# Patient Record
Sex: FEMALE | Race: WHITE | Employment: UNEMPLOYED | URBAN - METROPOLITAN AREA
[De-identification: names, ages, dates, MRNs, and addresses within clinical notes are randomized per-mention and may not be internally consistent; named-entity substitution may affect disease eponyms.]

---

## 2017-02-15 ENCOUNTER — OFFICE VISIT (OUTPATIENT)
Dept: PAIN MANAGEMENT | Age: 47
End: 2017-02-15

## 2017-02-15 VITALS — DIASTOLIC BLOOD PRESSURE: 71 MMHG | HEART RATE: 74 BPM | SYSTOLIC BLOOD PRESSURE: 111 MMHG

## 2017-02-15 DIAGNOSIS — M46.1 SACROILIITIS (HCC): Primary | ICD-10-CM

## 2017-02-15 DIAGNOSIS — M76.899 ENTHESOPATHY OF HIP REGION, UNSPECIFIED LATERALITY: ICD-10-CM

## 2017-02-15 RX ORDER — CLONAZEPAM 2 MG/1
TABLET ORAL
COMMUNITY

## 2017-02-15 RX ORDER — OXYBUTYNIN CHLORIDE 5 MG/1
5 TABLET ORAL AS NEEDED
COMMUNITY

## 2017-02-15 RX ORDER — GABAPENTIN 300 MG/1
300 CAPSULE ORAL 3 TIMES DAILY
Status: ON HOLD | COMMUNITY
End: 2017-03-14

## 2017-02-15 RX ORDER — OXYCODONE AND ACETAMINOPHEN 5; 325 MG/1; MG/1
1 TABLET ORAL
Status: ON HOLD | COMMUNITY
End: 2017-03-14

## 2017-02-15 RX ORDER — CLONIDINE HYDROCHLORIDE 0.1 MG/1
TABLET ORAL 2 TIMES DAILY
COMMUNITY
End: 2018-02-07

## 2017-02-15 RX ORDER — LAMOTRIGINE 200 MG/1
TABLET ORAL
COMMUNITY

## 2017-02-15 RX ORDER — ESTRADIOL 0.5 MG/1
TABLET ORAL DAILY
COMMUNITY

## 2017-02-15 RX ORDER — ROPINIROLE 0.5 MG/1
TABLET, FILM COATED ORAL 3 TIMES DAILY
COMMUNITY
End: 2018-02-07

## 2017-02-15 RX ORDER — LISINOPRIL 20 MG/1
TABLET ORAL DAILY
COMMUNITY

## 2017-02-15 RX ORDER — ASPIRIN 325 MG
325 TABLET ORAL DAILY
COMMUNITY
End: 2018-02-07

## 2017-02-15 RX ORDER — PAROXETINE 30 MG/1
30 TABLET, FILM COATED ORAL DAILY
COMMUNITY
End: 2018-02-07

## 2017-02-16 ENCOUNTER — OFFICE VISIT (OUTPATIENT)
Dept: PAIN MANAGEMENT | Age: 47
End: 2017-02-16

## 2017-02-16 VITALS
DIASTOLIC BLOOD PRESSURE: 74 MMHG | BODY MASS INDEX: 35.03 KG/M2 | SYSTOLIC BLOOD PRESSURE: 121 MMHG | HEIGHT: 66 IN | WEIGHT: 218 LBS | HEART RATE: 88 BPM

## 2017-02-16 DIAGNOSIS — M96.1 LUMBAR POST-LAMINECTOMY SYNDROME: Primary | ICD-10-CM

## 2017-02-16 DIAGNOSIS — M46.1 SACROILIITIS (HCC): ICD-10-CM

## 2017-02-16 DIAGNOSIS — G89.4 CHRONIC PAIN SYNDROME: ICD-10-CM

## 2017-02-16 DIAGNOSIS — M76.892 ENTHESOPATHY OF HIP REGION ON BOTH SIDES: ICD-10-CM

## 2017-02-16 DIAGNOSIS — M54.17 LUMBOSACRAL RADICULOPATHY: ICD-10-CM

## 2017-02-16 DIAGNOSIS — Z79.899 ENCOUNTER FOR LONG-TERM (CURRENT) USE OF MEDICATIONS: ICD-10-CM

## 2017-02-16 DIAGNOSIS — M76.891 ENTHESOPATHY OF HIP REGION ON BOTH SIDES: ICD-10-CM

## 2017-02-16 LAB
ALCOHOL UR POC: NORMAL
AMPHETAMINES UR POC: NEGATIVE
BARBITURATES UR POC: NEGATIVE
BENZODIAZEPINES UR POC: NORMAL
BUPRENORPHINE UR POC: NORMAL
CANNABINOIDS UR POC: NEGATIVE
CARISOPRODOL UR POC: NORMAL
COCAINE UR POC: NEGATIVE
FENTANYL UR POC: NORMAL
MDMA/ECSTASY UR POC: NEGATIVE
METHADONE UR POC: NEGATIVE
METHAMPHETAMINE UR POC: NEGATIVE
METHYLPHENIDATE UR POC: NEGATIVE
OPIATES UR POC: NORMAL
OXYCODONE UR POC: NORMAL
PHENCYCLIDINE UR POC: NEGATIVE
PROPOXYPHENE UR POC: NORMAL
TRAMADOL UR POC: NORMAL
TRICYCLICS UR POC: NEGATIVE

## 2017-02-16 RX ORDER — MORPHINE SULFATE 15 MG/1
15 TABLET ORAL
Qty: 60 TAB | Refills: 0 | Status: ON HOLD | OUTPATIENT
Start: 2017-02-16 | End: 2017-02-21

## 2017-02-16 RX ORDER — MORPHINE SULFATE 15 MG/1
15 TABLET ORAL
Qty: 60 TAB | Refills: 0 | Status: ON HOLD | OUTPATIENT
Start: 2017-03-17 | End: 2017-02-22 | Stop reason: SINTOL

## 2017-02-16 RX ORDER — ALPRAZOLAM 1 MG/1
TABLET ORAL
COMMUNITY
End: 2018-02-07

## 2017-02-16 RX ORDER — MORPHINE SULFATE 15 MG/1
15 TABLET, FILM COATED, EXTENDED RELEASE ORAL 3 TIMES DAILY
Qty: 90 TAB | Refills: 0 | Status: SHIPPED | OUTPATIENT
Start: 2017-03-17 | End: 2017-02-22 | Stop reason: SINTOL

## 2017-02-16 RX ORDER — MORPHINE SULFATE 15 MG/1
TABLET, FILM COATED, EXTENDED RELEASE ORAL
Qty: 80 TAB | Refills: 0 | Status: ON HOLD | OUTPATIENT
Start: 2017-02-16 | End: 2017-02-21

## 2017-02-16 RX ORDER — GABAPENTIN 400 MG/1
400 CAPSULE ORAL 3 TIMES DAILY
Qty: 90 CAP | Refills: 5 | Status: SHIPPED | OUTPATIENT
Start: 2017-02-16 | End: 2017-06-21 | Stop reason: SDUPTHER

## 2017-02-16 NOTE — MR AVS SNAPSHOT
Visit Information Date & Time Provider Department Dept. Phone Encounter #  
 2/16/2017  1:00 PM Renetta Ventura MD [facility] [phone] Follow-up Instructions Return in about 2 months (around 4/16/2017). Your Appointments 2/21/2017  8:10 AM  
PROCEDURE with Alyssa Dumont MD  
[facility] NEURO (SARBJIT SCHEDULING) Appt Note: B/L SIJ and B/L TBI per Dorie ( 1 out 2). ..... [facility] [address] [facility] [address]  
[phone]  
  
   
 [facility] [address]  
  
    
 3/22/2017  4:30 PM  
Office Visit with Alyssa Dumont MD  
[facility] [address] [facility]) Appt Note: Office visit post procedure. .. Billy Edgar [facility] [address] [address]  
[phone] [facility] [phone] Upcoming Health Maintenance Date Due DTaP/Tdap/Td series (1 - Tdap) 1/30/1993 PAP AKA CERVICAL CYTOLOGY 1/30/1993 INFLUENZA AGE 9 TO ADULT 8/1/2016 Allergies as of 2/16/2017  Review Complete On: 2/16/2017 By: Renetta Ventura MD  
  
 Severity Noted Reaction Type Reactions Iodine High 02/15/2017    Anaphylaxis Codeine  02/15/2017    Hives Haldol [Haloperidol Lactate]  02/15/2017    Other (comments)  
 violent Imitrex [Sumatriptan]  02/15/2017    Swelling Penicillins  02/15/2017    Hives Ultram [Tramadol]  02/15/2017    Hives Current Immunizations  Never Reviewed No immunizations on file. Not reviewed this visit You Were Diagnosed With   
  
 Codes Comments Encounter for long-term (current) use of medications    -  Primary ICD-10-CM: U87.146 ICD-9-CM: V58.69 Sacroiliitis ([facility] [address]     ICD-10-CM: M46.1 ICD-9-CM: 720.2 Enthesopathy of hip region on both sides     ICD-10-CM: M76.891, F64.266 ICD-9-CM: 726.5 Lumbar post-laminectomy syndrome     ICD-10-CM: M96.1 ICD-9-CM: 722.83 Lumbosacral radiculopathy     ICD-10-CM: M54.17 ICD-9-CM: 724.4 Chronic pain syndrome     ICD-10-CM: G89.4 ICD-9-CM: 338. 4 Vitals BP Pulse Height(growth percentile) Weight(growth percentile) BMI OB Status 121/74 88 5' 6\" (1.676 m) 218 lb (98.9 kg) 35.19 kg/m2 Hysterectomy Smoking Status Former Smoker Vitals History BMI and BSA Data Body Mass Index Body Surface Area  
 35.19 kg/m 2 2.15 m 2 Preferred Pharmacy Pharmacy Name Phone [facility], [address] [place] [address] [phone] Your Updated Medication List  
  
   
This list is accurate as of: 2/16/17  1:23 PM.  Always use your most recent med list.  
  
  
  
  
 aspirin 325 mg tablet Generic drug:  aspirin Take 325 mg by mouth daily. clonazePAM 2 mg tablet Commonly known as:  [place] Scrape Take  by mouth two (2) times a day. cloNIDine HCl 0.1 mg tablet Commonly known as:  CATAPRES Take  by mouth two (2) times a day. estradiol 0.5 mg tablet Commonly known as:  ESTRACE Take  by mouth daily. * gabapentin 300 mg capsule Commonly known as:  NEURONTIN Take 300 mg by mouth three (3) times daily. * gabapentin 400 mg capsule Commonly known as:  NEURONTIN Take 1 Cap by mouth three (3) times daily for 30 days. Indications: NEUROPATHIC PAIN  
  
 lamoTRIgine 200 mg tablet Commonly known as: LaMICtal  
Take  by mouth daily. lisinopril 20 mg tablet Commonly known as:  Doris Shawna Take  by mouth daily. * morphine CR 15 mg CR tablet Commonly known as:  MS CONTIN For chronic pain 1 po bid x 14 days, then 1 po tid  Indications: Chronic Pain, Severe Pain * morphine IR 15 mg tablet Commonly known as:  MS IR Take 1 Tab by mouth two (2) times daily as needed for Pain for up to 30 days. Max Daily Amount: 30 mg. Indications: Pain, Severe Pain * morphine CR 15 mg CR tablet Commonly known as:  MS CONTIN Take 1 Tab by mouth three (3) times daily for 30 days. Max Daily Amount: 45 mg. For chronic pain  Indications: Chronic Pain, Severe Pain Start taking on:  3/17/2017 * morphine IR 15 mg tablet Commonly known as:  MS IR Take 1 Tab by mouth two (2) times daily as needed for Pain for up to 30 days. Max Daily Amount: 30 mg. Indications: Pain Start taking on:  3/17/2017  
  
 oxybutynin 5 mg tablet Commonly known as:  XUJXRSYI Take 5 mg by mouth three (3) times daily. oxyCODONE-acetaminophen 5-325 mg per tablet Commonly known as:  PERCOCET Take 1 Tab by mouth every four (4) hours as needed for Pain. PARoxetine 30 mg tablet Commonly known as:  PAXIL Take 30 mg by mouth daily. rOPINIRole 0.5 mg tablet Commonly known as:  Veatrice Tamara Take  by mouth three (3) times daily. TROKENDI  mg capsule Generic drug:  topiramate ER Take  by mouth daily. XANAX 1 mg tablet Generic drug:  ALPRAZolam  
Take  by mouth. * Notice: This list has 6 medication(s) that are the same as other medications prescribed for you. Read the directions carefully, and ask your doctor or other care provider to review them with you. Prescriptions Printed Refills  
 morphine CR (MS CONTIN) 15 mg CR tablet 0 Starting on: 3/17/2017 Sig: Take 1 Tab by mouth three (3) times daily for 30 days. Max Daily Amount: 45 mg. For chronic pain  Indications: Chronic Pain, Severe Pain Class: Print Route: Oral  
 morphine CR (MS CONTIN) 15 mg CR tablet 0 Sig: For chronic pain 1 po bid x 14 days, then 1 po tid  Indications: Chronic Pain, Severe Pain Class: Print  
 morphine IR (MS IR) 15 mg tablet 0 Starting on: 3/17/2017 Sig: Take 1 Tab by mouth two (2) times daily as needed for Pain for up to 30 days. Max Daily Amount: 30 mg. Indications: Pain Class: Print  Route: Oral  
 morphine IR (MS IR) 15 mg tablet 0 Sig: Take 1 Tab by mouth two (2) times daily as needed for Pain for up to 30 days. Max Daily Amount: 30 mg. Indications: Pain, Severe Pain Class: Print Route: Oral  
  
Prescriptions Sent to Pharmacy Refills  
 gabapentin (NEURONTIN) 400 mg capsule 5 Sig: Take 1 Cap by mouth three (3) times daily for 30 days. Indications: NEUROPATHIC PAIN Class: Normal  
 Pharmacy: [address], [address] [facility] #: [phone] Route: Oral  
  
We Performed the Following AMB POC DRUG SCREEN () [ [facility]] DRUG SCREEN [[id number] Custom] Follow-up Instructions Return in about 2 months (around 4/16/2017). Patient Instructions Current health maintenance issues were reviewed and the patient was advised to followup with his/her PCP for completion of these items. Introducing [facility]! Magalys Haas introduces [employer] patient portal. Now you can access parts of your medical record, email your doctor's office, and request medication refills online. 1. In your internet browser, go to [email or web address] 2. Click on the First Time User? Click Here link in the Sign In box. You will see the New Member Sign Up page. 3. Enter your [employer] Access Code exactly as it appears below. You will not need to use this code after youve completed the sign-up process. If you do not sign up before the expiration date, you must request a new code. · [employer] Access Code: [email or web address] Expires: 5/4/2017  9:13 AM 
 
4. Enter the last four digits of your Social Security Number (xxxx) and Date of Birth (mm/dd/yyyy) as indicated and click Submit. You will be taken to the next sign-up page. 5. Create a [employer] ID. This will be your [employer] login ID and cannot be changed, so think of one that is secure and easy to remember. 6. Create a [employer] password. You can change your password at any time. 7. Enter your Password Reset Question and Answer. This can be used at a later time if you forget your password. 8. Enter your e-mail address. You will receive e-mail notification when new information is available in [address] 9. Click Sign Up. You can now view and download portions of your medical record. 10. Click the Download Summary menu link to download a portable copy of your medical information. If you have questions, please visit the Frequently Asked Questions section of the [employer] website. Remember, [employer] is NOT to be used for urgent needs. For medical emergencies, dial 911. Now available from your iPhone and Android! Please provide this summary of care documentation to your next provider. Your primary care clinician is listed as TAYE GASCA. If you have any questions after today's visit, please call [phone].
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
## 2017-02-16 NOTE — PROGRESS NOTES
[facility]  Interventional Pain Management Consultation History & Physical    PATIENT NAME:  Heber Corbin     YOB: 1970    DATE OF SERVICE:   2/15/2017      CHIEF COMPLAINT:  Back Pain; Hip Pain; and Leg Pain      HISTORY OF PRESENT ILLNESS:   Heber Corbin presents to the pain clinic today for initial evaluation and to consider interventional pain management options as indicated for the type and location of the pain the patient is presenting with. Heber Corbin patient presents for initial evaluation and consideration for interventional procedures as indicated. She is referred to us by Dr. Gil Zheng for evaluation and consideration for possible sacroiliac and possibly trochanteric bursa or hip injections. At today's evaluation patient endorses chronic low back pain, bilateral buttocks pain and bilateral hip pain of long-standing duration. She currently endorses most of her pain and bilateral buttocks area as well as bilateral lateral hip area. She endorses that both sides of her low back and hips feel like balls fire. She endorses aching and throbbing of both of her bilateral buttocks as well as lateral hip areas. Her pain medications are less effective for her than they previously had been. She is tried also physical therapy as well as home exercises with no appreciable benefit of her pain is noted. Pain is increased with prolonged sitting standing and walking. Pain is increased with lying on either side of her hips. By review of available medical records, patient is noted to have been followed at other interventional pain Center's and clinics for long-standing period of time. She is noted to have laminectomy and discectomy with redo at same level by Dr. Hernandez Po September 2001. Initial laminectomy and discectomy on the right side L5 was done June 2000 at [address] [facility], Dr. Taryn Mehta.   Dr. Smart Score Debbie Metz did a redo operation same levels. Patient went on to have a multilevel fusion L3-4 to S1 August 2008. She [place] [facility] [place] a period of time in [place]. Patient is noted to have a coronary artery stent single stent as well as lumbar laminectomy ×2 lumbar fusion ×2 as already stated. Smoked half pack a day. Patient had a recent lumbar MRI at [facility]. MRI was performed February 1, 2017. This indicates stable postoperative changes with bilateral laminectomies at L4-5 and L5-S1 posterior metal fixation L4 through S1. Scar tissue within the right L5 and perhaps right S1 lateral recesses. No evidence of recurrent or residual disc herniations. Mild annular bulging and 2 mm retrolisthesis with mild left neuroforaminal narrowing L3-4. L2-3 mild annular bulging and 2 mm retrolisthesis, again L2-3. We discussed options. Patient has history of chronic and severe low back pain. She has had 2 previous lumbar spine surgeries including lumbar spine decompression surgeries ×2 and as well lumbar spinal fusion L4-S1. She is previously been managed [place] [facility] where she has received her pain management medications and I believe interventional pain procedures as indicated and needed. Patient presents today with bilateral posterior buttocks pain and bilateral lateral hip pain and tenderness to palpation. I am in agreement with her primary care provider Dr. Rebeca Luis in that I believe the patient has bilateral sacroiliitis versus sacroiliac joint dysfunction, along with bilateral trochanteric bursitis. I will provide the patient with a series of bilateral sacroiliac joint injections and as well bilateral trochanteric bursa injections. We will probably do a series of at least 2 of these with p.o. Valium sedation.   I discussed the risk and benefits, indications contraindications side effects this procedure with the patient. She understands and wishes to proceed. She has no further questions. I have also discussed this briefly with the patient possibility of spinal cord stimulator trial placement. This may ultimately be her best option if other measures fail to help with her pain of her low back and lower extremities. For now, we will see how these above injections helped her pain. MRI: Reviewed imaging using skeleton spine model    PROCEDURES: Discussed bilateral sacroiliac as well as trochanteric bursa injections    MRI Results (most recent):  No results found for this or any previous visit. PAST MEDICAL HISTORY:   The patient  has no past medical history on file. PAST SURGICAL HISTORY:   The patient  has no past surgical history on file. CURRENT MEDICATIONS:   The patient has a current medication list which includes the following prescription(s): oxycodone-acetaminophen, oxybutynin, ropinirole, estradiol, topiramate er, clonazepam, gabapentin, lamotrigine, clonidine hcl, paroxetine, lisinopril, and aspirin. ALLERGIES:     Allergies   Allergen Reactions    Iodine Anaphylaxis    Codeine Hives    Haldol [Haloperidol Lactate] Other (comments)     violent    Imitrex [Sumatriptan] Swelling    Penicillins Hives    Ultram [Tramadol] Hives       FAMILY HISTORY:   The patient family history is not on file. SOCIAL HISTORY:   The patient  reports that she quit smoking about 2 years ago. Her smoking use included Cigarettes. She does not have any smokeless tobacco history on file. The patient  has no alcohol history on file. She also  has no drug history on file.     REVIEW OF SYSTEMS:   The patient denies fever, chills, weight loss (Constitutional), rash, itching (Skin), tinnitus, congestion (HENT), blurred vision, photophobia (Eyes), palpitations, orthopnea (Cardiovascular), hemoptysis, wheezing (Respiratory), nausea, vomiting, diarrhea (Gastrointestinal), dysuria, hematuria, urgency (Genitourinary), easy bruising, bleeding abnormalities (Hematologic), bowel or bladder incontinence, loss of consciousness (Neurologic), suicidal or homicidal ideation or hallucinations (Psychiatric). PHYSICAL EXAM:  VS:   Visit Vitals    /71 (BP 1 Location: Right arm, BP Patient Position: Sitting)    Pulse 74     General: Well-developed and well-nourished. Body habitus consistent with recorded height and weight and the calculated BMI. Apparent distress due to low back, bilateral buttock and bilateral hip pain. Head: Normocephalic, atraumatic. Skin: Inspection of the skin reveals no rashes, lesions or infection. CV: Regular rate. No murmurs or rubs noted. No peripheral edema noted. Pulm: Respirations are even and unlabored. Extr: No clubbing, cyanosis, or edema noted. Musculoskeletal:  1. Cervical spine - Full ROM. No paraspinous tenderness at any level. There is no scoliosis, asymmetry, or musculoskeletal defect. 2. Thoracic spine - Full ROM. No paraspinous tenderness at any level. There is no scoliosis, asymmetry, or musculoskeletal defect. 3. Lumbar spine -decreased range of motion all axes . Paraspinous tenderness at bilateral lumbar levels. SI joints are tender bilaterally. There is no scoliosis, asymmetry, or musculoskeletal defect. Trochanteric bursae are tender bilaterally. 4. Right upper extremity - Full ROM. 5/5 muscle strength in all muscle groups. No pain or tenderness in shoulder, elbow, wrist, or hand. 5. Left upper extremity - Full ROM. 5/5 muscle strength in all muscle groups. No pain or tenderness in shoulder, elbow, wrist, or hand. 6. Right lower extremity - Full ROM. 5/5 muscle strength in all muscle groups. No pain, tenderness, or swelling in the hip, knee, ankle or foot. 7. Left lower extremity - Full ROM. 5/5 muscle strength in all muscle groups. No pain, tenderness, or swelling in the hip, knee, ankle or foot. Neurological:  1.  Mental Status - Alert, awake and oriented. Speech is clear and appropriate. 2. Cranial Nerves - Extraocular muscles intact bilaterally. Cranial nerves II-XII grossly intact bilaterally. 3. Gait - antalgic   4. Reflexes - 2+ and symmetric throughout. 5. Sensation - Intact to light touch and pin prick. 6. Provocative Tests - Spurlings negative bilaterally. Straight leg raise negative bilaterally. Psychological:  1. Mood and affect - Appropriate. 2. Speech - Appropriate. 3. Though content - Appropriate. 4. Judgment - Appropriate. ASSESSMENT:      ICD-10-CM ICD-9-CM    1. Sacroiliitis (HCC) M46.1 720.2    2. Enthesopathy of hip region, unspecified laterality M76.899 726.5            PLAN:    1. Diagnoses/Plan: Bilateral sacroiliitis, bilateral hip enthesopathy. We discussed options. Patient has history of chronic and severe low back pain. She has had 2 previous lumbar spine surgeries including lumbar spine decompression surgeries ×2 and as well lumbar spinal fusion L4-S1. She is previously been managed at [facility] management center where she has received her pain management medications and I believe interventional pain procedures as indicated and needed. Patient presents today with bilateral posterior buttocks pain and bilateral lateral hip pain and tenderness to palpation. I am in agreement with her primary care provider Dr. Leopold Lager in that I believe the patient has bilateral sacroiliitis versus sacroiliac joint dysfunction, along with bilateral trochanteric bursitis. I will provide the patient with a series of bilateral sacroiliac joint injections and as well bilateral trochanteric bursa injections. We will probably do a series of at least 2 of these with p.o. Valium sedation. I discussed the risk and benefits, indications contraindications side effects this procedure with the patient. She understands and wishes to proceed. She has no further questions.      I have also discussed this briefly with the patient possibility of spinal cord stimulator trial placement. This may ultimately be her best option if other measures fail to help with her pain of her low back and lower extremities. For now, we will see how these above injections helped her pain. 2.    I have thoroughly discussed the risks and benefits, side effects and complications, of any and all procedures that were mentioned at today's patient visit. I have used a skeleton model for added emphasis and patient education. I have answered all questions, and I have obtained verbal confirmation for all procedures planned with the patient. 3.    I have reviewed in great detail today the patient's MRI and other imaging studies with the patient. I have explained to the patient their condition using both actual recent and relevant images. I have used a skeleton model for added emphasis as well as patient education. 4.    I have advised patient to have a primary care provider to continue care for health maintenance and general medical conditions and support for referral to specialty care as needed. 5.    I have reviewed with patient the treatment plan, goals of treatment plan, and limitations of treatment plan, to include the potential for side effects from medications and procedures. If side effects occur, it is the responsibility of the patient to inform the clinic so that a change in the treatment plan can be made in a safe manner. The patient is advised that stopping prescribed medication may cause an increase in symptoms and possible medication withdrawal symptoms. The patient is informed an emergency room evaluation may be necessary if this occurs. DISPOSITION:   The patients condition and plan were discussed at length and all questions were answered. The patient agrees with the plan. A total of 40 minutes was spent with the patient of which over half of the time was spent counseling the patient.      Alyssa Dumont MD 2/15/2017 7:55 PM    Note: Although these clinic notes were documented by the provider at the time of the exam, they have not been proofed and are subject to transcription variance.

## 2017-02-20 ENCOUNTER — TELEPHONE (OUTPATIENT)
Dept: PAIN MANAGEMENT | Age: 47
End: 2017-02-20

## 2017-02-20 RX ORDER — DIAZEPAM 5 MG/1
15 TABLET ORAL ONCE
Status: CANCELLED | OUTPATIENT
Start: 2017-02-21 | End: 2017-02-21

## 2017-02-20 NOTE — TELEPHONE ENCOUNTER
Pt called to relay her new meds were too strong for her. She has discontinued the MS Contin due to severe headache. She has been breaking the MS IR in half but it still makes her vomit. Have asked is she is taking the med with food to which she relays it just comes right back up after the dosing. She has procedure with Dorie in the am and was advised she could discontinue med altogether and use OTC if she felt more comfortable until I could return her call with plan of action. She expressed understanding.  Will route to provider for consult

## 2017-02-21 ENCOUNTER — HOSPITAL ENCOUNTER (OUTPATIENT)
Age: 47
Setting detail: OUTPATIENT SURGERY
Discharge: HOME | End: 2017-02-21
Attending: PHYSICAL MEDICINE & REHABILITATION | Admitting: PHYSICAL MEDICINE & REHABILITATION
Payer: MEDICARE

## 2017-02-21 ENCOUNTER — APPOINTMENT (OUTPATIENT)
Dept: GENERAL RADIOLOGY | Age: 47
End: 2017-02-21
Attending: PHYSICAL MEDICINE & REHABILITATION
Payer: MEDICARE

## 2017-02-21 ENCOUNTER — SURGERY (OUTPATIENT)
Age: 47
End: 2017-02-21

## 2017-02-21 VITALS
RESPIRATION RATE: 16 BRPM | OXYGEN SATURATION: 97 % | WEIGHT: 218 LBS | HEART RATE: 88 BPM | BODY MASS INDEX: 35.03 KG/M2 | SYSTOLIC BLOOD PRESSURE: 134 MMHG | DIASTOLIC BLOOD PRESSURE: 86 MMHG | HEIGHT: 66 IN | TEMPERATURE: 98.4 F

## 2017-02-21 PROCEDURE — 74011250636 HC RX REV CODE- 250/636: Performed by: PHYSICAL MEDICINE & REHABILITATION

## 2017-02-21 PROCEDURE — 74011250637 HC RX REV CODE- 250/637: Performed by: PHYSICAL MEDICINE & REHABILITATION

## 2017-02-21 PROCEDURE — 76010000009 HC PAIN MGT 0 TO 30 MIN PROC: Performed by: PHYSICAL MEDICINE & REHABILITATION

## 2017-02-21 PROCEDURE — 77030003666 HC NDL SPINAL BD -A: Performed by: PHYSICAL MEDICINE & REHABILITATION

## 2017-02-21 RX ORDER — TRIAMCINOLONE ACETONIDE 40 MG/ML
INJECTION, SUSPENSION INTRA-ARTICULAR; INTRAMUSCULAR AS NEEDED
Status: DISCONTINUED | OUTPATIENT
Start: 2017-02-21 | End: 2017-02-21 | Stop reason: HOSPADM

## 2017-02-21 RX ORDER — DIAZEPAM 5 MG/1
15 TABLET ORAL ONCE
Status: COMPLETED | OUTPATIENT
Start: 2017-02-21 | End: 2017-02-21

## 2017-02-21 RX ORDER — LIDOCAINE HCL/PF 10 MG/ML
SYRINGE (ML) INJECTION AS NEEDED
Status: DISCONTINUED | OUTPATIENT
Start: 2017-02-21 | End: 2017-02-21 | Stop reason: HOSPADM

## 2017-02-21 RX ADMIN — Medication 14 ML: at 09:02

## 2017-02-21 RX ADMIN — DIAZEPAM 15 MG: 5 TABLET ORAL at 08:03

## 2017-02-21 RX ADMIN — TRIAMCINOLONE ACETONIDE 120 MG: 40 INJECTION, SUSPENSION INTRA-ARTICULAR; INTRAMUSCULAR at 09:02

## 2017-02-21 NOTE — DISCHARGE INSTRUCTIONS
[facility]      Post Procedures Instructions    *Resume Diet and Activity as tolerated. Rest for the remainder of the day. *You may fell worse before you feel better as the numbing medications wear off before the steroids take effect if used for your procedures. *Do not use affected extremity until numbness or loss of sensation has completely resolved without assistance. *DO NOT DRIVE, operate machinery/heavey equipment for 24 hours. *DO NOT DRINK ALCOHOL for 24 hours as it may interact with the sedation if you received it and also thins your blood and may cause you to bleed. *WAIT 24 hours before starting back ANY Blood thinning medications:   (Heparin, Coumadin, Warfarin, Lovenox, Plavix, Aggrenox)    *Resume Pre-Procedure Medications as prescribed except Blood Thinners unless directed by your Physician or Cardiologist.     *Avoid Hot tubs and Heating pad for 24 hours to prevent dissipation of medications, you may shower to remove bandages and remaining prep residue on the skin. * If you develop a Headache, drink plenty of fluids including beverages with caffeine (Coffee, Mt. Dew etc.) and rest.  If the headache persists longer than 24 hoursor intensifies - Please call [facility] ([facility]) [phone]    * If you are DIABETIC, check your blood sugar three times a day for the next three days, the steroids will increase your blood sugar. If your blood sugar is greater than 400 have someone drive you to the nearest [address]. * If you experience any of the following problems, call the [facility] [phone] between 8:00 am - 4:30pm or After Hours [phone].     Shortness of breath    Fever of 101 F or higher    Nausea / Vomiting (not normal to you)    Increasing stiffness in the neck    Weakness or numbness in the arms or legs that is not resolving    Prolonged and increasing pain > than 4 days    ANYTHING OUT of the ORDINARY TO YOU    If YOU are experiencing a severe reaction / complication that you have never had before post procedure, call 911 or go to the nearest emergency room! All patients must have a  for transportation [place] regardless if you do or do not receive sedation. DISCHARGE SUMMARY from Nurse      PATIENT INSTRUCTIONS:    After Oral  or intravenous sedation, for 24 hours or while taking prescription Narcotics:  · Limit your activities  · Do not drive and operate hazardous machinery  · Do not make important personal or business decisions  · Do  not drink alcoholic beverages  · If you have not urinated within 8 hours after discharge, please contact your surgeon on call. Report the following to your surgeon:  · Excessive pain, swelling, redness or odor of or around the surgical area  · Temperature over 101  · Nausea and vomiting lasting longer than 4 hours or if unable to take medications  · Any signs of decreased circulation or nerve impairment to extremity: change in color, persistent  numbness, tingling, coldness or increase pain  · Any questions        What to do at Home:  Recommended activity: Activity as tolerated, NO DRIVING FOR 24 Hours post injection          *  Please give a list of your current medications to your Primary Care Provider. *  Please update this list whenever your medications are discontinued, doses are      changed, or new medications (including over-the-counter products) are added. *  Please carry medication information at all times in case of emergency situations. These are general instructions for a healthy lifestyle:    No smoking/ No tobacco products/ Avoid exposure to second hand smoke    Surgeon General's Warning:  Quitting smoking now greatly reduces serious risk to your health.     Obesity, smoking, and sedentary lifestyle greatly increases your risk for illness    A healthy diet, regular physical exercise & weight monitoring are important for maintaining a healthy lifestyle    You may be retaining fluid if you have a history of heart failure or if you experience any of the following symptoms:  Weight gain of 3 pounds or more overnight or 5 pounds in a week, increased swelling in our hands or feet or shortness of breath while lying flat in bed. Please call your doctor as soon as you notice any of these symptoms; do not wait until your next office visit. Recognize signs and symptoms of STROKE:    F-face looks uneven    A-arms unable to move or move unevenly    S-speech slurred or non-existent    T-time-call 911 as soon as signs and symptoms begin-DO NOT go       Back to bed or wait to see if you get better-TIME IS BRAIN.

## 2017-02-21 NOTE — PROCEDURES
[facility]    SACROILIAC JOINT, [address] [facility] INJECTIONS   PROCEDURE REPORT      PATIENT:  Jah Organ OF BIRTH:  1970  DATE OF SERVICE:  2/21/2017  [facility]    PRE-PROCEDURE DIAGNOSIS:  See Above    POST-PROCEDURE DIAGNOSIS:  See Above                PROCEDURE:    1. Bilateral sacroiliac joint injection (45232, 50 )  2. Fluoroscopic needle guidance (spinal) (31151)  3. Bilateral greater trochanteric bursae injection (16969, 50 )  4. Fluoroscopic needle guidance (non-spinal) (94473)    ANESTHESIA:  See Medication Administration Record    COMPLICATIONS: None. PHYSICIAN:  Ángel Moore MD    PRE-PROCEDURE NOTE:  Pre-procedural assessment of the patient was performed including a limited history and physical examination. The details of the procedure were discussed with the patient, including the risks, benefits and alternative options and an informed consent was obtained. The patients NPO status, if necessary for the specific procedure and/or administration of moderate intravenous sedation, if utilized, and availability of a responsible adult to escort the patient following the procedure were confirmed. PROCEDURE NOTE:  The patient was brought to the procedure suite and positioned on the fluoroscopy table in the prone position. Physiologic monitors were applied and supplemental oxygen was administered via nasal cannula. The skin was prepped in the standard surgical fashion and sterile drapes were applied over the procedure site. Please refer to the Flowsheet for documentation of the patients vital signs and the Medication Administration Record for any oral and/or intravenous sedation administered prior to or during the procedure. 1% Lidocaine was utilized for local anesthesia.   Under slight contralateral oblique fluoroscopic guidance, a 22-gauge, 3.5-inch short bevel spinal needle was advanced into the SI joint from the posteriomedial approach. Following this, 3 mL of a solution comprised of 1 mL of triamcinolone (40 mg/mL) and 2 mL of 1% lidocaine was injected after negative aspiration of blood, air, or fluid. The needle was restiletted and removed intact. The procedure was repeated on the contralateral side in the same fashion. Attention was then directed to the greater trochanter. Under AP fluoroscopic guidance, a 25g, 3.5-inch short bevel needle was advanced from lateral to medial, until the greater trochanter was contacted at the trochanteric bursa. Following this, 4 mL of a solution comprised of 3.5 mL of 1% lidocaine and 0.5 mL of triamcinolone (40 mg/mL) was injected slowly after negative aspiration of blood. The needle was restiletted and removed intact. The procedure was repeated on the contralateral side in the same fashion. The needle was removed intact. The area was thoroughly cleaned and sterile bandages applied as necessary. The patient tolerated the procedure well and vital signs remained stable throughout the procedure. POST-PROCEDURE COURSE:   The patient was escorted from the procedure suite in satisfactory condition and recovered per facility protocol based on the type of procedure performed and/or the sedation utilized. The patient did not experience any adverse events and remained hemodynamically stable during the post-procedure period. DISCHARGE NOTE:  Upon discharge, the patient was able to tolerate fluids and was in no acute distress. The patient was oriented to person, place and time and vital signs were stable. Appropriate post-procedure instructions were provided and explained to the patient in detail and all questions were answered.     Kam Glynn MD 2/21/2017 10:09 AM

## 2017-02-21 NOTE — INTERVAL H&P NOTE
H&P Update: Pee Velasquez was seen and examined. History and physical has been reviewed. The patient has been examined.  There have been no significant clinical changes since the completion of the originally dated History and Physical.    Signed By: Jere Perrin MD     February 21, 2017 7:15 AM

## 2017-02-21 NOTE — PROCEDURES
[facility]    DIAG LUMBAR FACET INJECTIONS  PROCEDURE REPORT      PATIENT:  Jovana Eisenberg OF BIRTH:  1970  DATE OF SERVICE:  2/21/2017  [facility]    PRE-PROCEDURE DIAGNOSIS:  See Above    POST-PROCEDURE DIAGNOSIS:  See Above                PROCEDURE:  1. Bilateral diagnostic lumbar medial branch blocks via the L3/L4,  L4/L5,  L5/S1 medial branch nerves ( 61682, 50;  21184, 50;  81624, 50 )  2. Fluoroscopic needle guidance (02137)      LEVELS TREATED:  Bilateral sided L3/L4,  L4/L5,  L5/S1 medial branch nerves     ANESTHESIA:  See Medication Administration Record    COMPLICATIONS: None. PHYSICIAN:  Justin Denise MD    PRE-PROCEDURE NOTE:  Pre-procedural assessment of the patient was performed including a limited history and physical examination. The details of the procedure were discussed with the patient, including the risks, benefits and alternative options and an informed consent was obtained. The patients NPO status, if necessary for the specific procedure and/or administration of moderate intravenous sedation, if utilized, and availability of a responsible adult to escort the patient following the procedure were confirmed. PROCEDURE NOTE:  The patient was brought to the procedure suite and positioned on the fluoroscopy table in the prone position. Physiologic monitors were applied and supplemental oxygen was administered via nasal cannula. The skin was prepped in the standard surgical fashion and sterile drapes were applied over the procedure site. Please refer to the Flowsheet for documentation of the patients vital signs and the Medication Administration Record for any oral and/or intravenous sedation administered prior to or during the procedure. 1% Lidocaine was utilized for local anesthesia.  Under AP fluoroscopic guidance a 25-gauge, 3-1/2 inch short bevel spinal needle was advanced to the junction of the superior articular process and transverse process of each vertebral level immediately inferior to the above-mentioned dorsal rami medial branch nerves. A needle was also placed along the sacral ala to block the L5 medial branch nerve. After all needles were placed,  0.5 mL of 0.2% ropivacaine was injected at each location after the negative aspiration of blood, air or CSF. The needles were removed and the stilets were replaced. The procedure was performed on the contralateral side in the same fashion and at the same levels using the same volume of local anesthetic following negative aspiration of blood, air or CSF. The needles were removed intact. The area was thoroughly cleaned and sterile bandages applied as necessary. The patient tolerated the procedure well and vital signs remained stable throughout the procedure. The patient was assessed immediately following the procedure and was noted to have greater than 50% reduction in pain (a reduction from 7/10 to 3/10 in severity of lumbar pain). Based on these results, this procedure will be repeated to assess if the patient is an appropriate candidate for radiofrequency ablation of the above-mentioned medial branch nerves. Based on these results, the patient is considered an appropriate candidate for radiofrequency ablation of the above-mentioned medial branch nerves and will be scheduled for that procedure. The total levels successfully blocked were 3 levels, both sides. POST-PROCEDURE COURSE:  The patient was escorted from the procedure suite in satisfactory condition and recovered per facility protocol based on the type of procedure performed and/or the sedation utilized. The patient did not experience any adverse events and remained hemodynamically stable during the post-procedure period.       DISCHARGE NOTE:  Upon discharge, the patient was able to tolerate fluids and was in no acute distress. The patient was oriented to person, place and time and vital signs were stable. Appropriate post-procedure instructions were provided and explained to the patient in detail and all questions were answered.     Danielle Barger MD 2/21/2017 10:12 AM

## 2017-02-22 ENCOUNTER — OFFICE VISIT (OUTPATIENT)
Dept: PAIN MANAGEMENT | Age: 47
End: 2017-02-22

## 2017-02-22 VITALS
SYSTOLIC BLOOD PRESSURE: 128 MMHG | BODY MASS INDEX: 35.19 KG/M2 | WEIGHT: 218 LBS | DIASTOLIC BLOOD PRESSURE: 95 MMHG | HEART RATE: 125 BPM

## 2017-02-22 DIAGNOSIS — M70.62 TROCHANTERIC BURSITIS OF BOTH HIPS: Primary | ICD-10-CM

## 2017-02-22 DIAGNOSIS — Z79.899 ENCOUNTER FOR LONG-TERM (CURRENT) USE OF MEDICATIONS: ICD-10-CM

## 2017-02-22 DIAGNOSIS — M70.61 TROCHANTERIC BURSITIS OF BOTH HIPS: Primary | ICD-10-CM

## 2017-02-22 DIAGNOSIS — M54.17 LUMBOSACRAL RADICULOPATHY: ICD-10-CM

## 2017-02-22 DIAGNOSIS — M46.1 SACROILIITIS (HCC): ICD-10-CM

## 2017-02-22 DIAGNOSIS — M96.1 LUMBAR POST-LAMINECTOMY SYNDROME: ICD-10-CM

## 2017-02-22 DIAGNOSIS — M76.891 ENTHESOPATHY OF HIP REGION ON BOTH SIDES: ICD-10-CM

## 2017-02-22 DIAGNOSIS — G89.4 CHRONIC PAIN SYNDROME: ICD-10-CM

## 2017-02-22 DIAGNOSIS — M76.892 ENTHESOPATHY OF HIP REGION ON BOTH SIDES: ICD-10-CM

## 2017-02-22 RX ORDER — TRIAMCINOLONE ACETONIDE 40 MG/ML
60 INJECTION, SUSPENSION INTRA-ARTICULAR; INTRAMUSCULAR ONCE
Qty: 1 ML | Refills: 0
Start: 2017-02-22 | End: 2017-02-22

## 2017-02-22 RX ORDER — OXYCODONE AND ACETAMINOPHEN 10; 325 MG/1; MG/1
1 TABLET ORAL
Qty: 120 TAB | Refills: 0 | Status: SHIPPED | OUTPATIENT
Start: 2017-03-23 | End: 2017-03-31 | Stop reason: SDUPTHER

## 2017-02-22 RX ORDER — OXYCODONE AND ACETAMINOPHEN 10; 325 MG/1; MG/1
1 TABLET ORAL
Qty: 120 TAB | Refills: 0 | Status: ON HOLD | OUTPATIENT
Start: 2017-02-22 | End: 2017-03-14

## 2017-02-22 RX ORDER — BUPIVACAINE HYDROCHLORIDE 2.5 MG/ML
10 INJECTION, SOLUTION EPIDURAL; INFILTRATION; INTRACAUDAL ONCE
Qty: 10 ML | Refills: 0
Start: 2017-02-22 | End: 2017-02-22

## 2017-02-22 NOTE — PROGRESS NOTES
Ms. Ely Rios was advised to discontinue ms contin,msir. She was provided education on proper medication disposal options as indicated by the FDA/ERLINDA. She was also advised that failing to properly dispose of medications that are no longer part of her regimen would be considered non-compliance with the treatment plan.

## 2017-02-22 NOTE — PATIENT INSTRUCTIONS
You are in possession of medication that is no longer part of your active treatment regimen. We strongly advise that you properly dispose of it as quickly as possible to help reduce the chance that others may accidentally take or intentionally misuse the discontinued medication. Please understand that ongoing use or distribution of a discontinued medication containing a controlled substance is a violation of your signed Controlled Substance Consent & Treatment Agreement and will result in dismissal from our practice. Listed below are some options and special instructions to consider when disposing of discontinued, , unwanted, or unused medications:    · Drug [employer] (ERLINDA) authorized collectors cansafely and securely collect and dispose of pharmaceuticals containing controlled substances and other medications. Authorized collection sites may be [employer], hospital or clinic pharmacies, and law enforcement locations. For ERLINDA-authorized collectors near you, contact the [place] of [address] [place] [address] at [phone] or visit the following web address: [employer]. [email or web address]. ERLINDA-authorized collectors within the local [address] [place] [address] [place] include:  [address] [address], [address]    [address]  [address], [facility]  [address] [place] [address], [facility] [address], [address]    [address]  Formerly named [facility] [address], [facility] [address] [facility]    · Medicine take-back programs are another good way to safely dispose of most types of discontinued medications. The ERLINDA periodically hosts National Prescription Drug Take-Back events where collection sites are set up in communities nationwide for safe disposal of prescription drugs. Local law enforcement agencies may also sponsor medication take-back programs. · If unable to reach a medication take-back program or ERLINDA-authorized , you can also follow these simple steps to dispose of medications in the household trash:  1. Mix medications (do not crush tablets or capsules) with an unappetizing substance such as dirt, amalia litter, or used coffee grounds;  2. Place the mixture in a container such as a sealed plastic bag;  3. Throw the container in your household trash;  4. Scratch out all personal information on the prescription label of your empty pill bottle or empty medication packaging to make it unreadable, then dispose of the container. Current health maintenance issues were reviewed and the patient was advised to followup with his/her PCP for completion of these items. Post Injection Instructions    If you develop any abnormal symptoms, such as itching, swelling, redness, rash, or shortness of breath, please call our office. Normally, these are temporary symptoms, which resolve within several hours to a day, but our office is more than happy to answer any questions you may have. The provider would like you to observe the injection area for redness, swelling, or increased heat. If any or all of these reactions occur, please call our office as soon as possible. This reaction may indicate the first signs of infection. Although very rare, it is  best if caught early. I have reviewed these instructions and the patient verbalizes understanding.

## 2017-02-22 NOTE — PROGRESS NOTES
HISTORY OF PRESENT ILLNESS  Aroldo Queen is a 41 y.o. female. HPI she returns in advance of her regularly scheduled visit secondary to 2 problems:  1. Is intolerance to the MS Contin and MSIR feeling that the medication is \"too strong\" producing dizziness and a feeling of disorientation. 2.  Although having undergone bilateral sacroiliac joint injections she does not recall having the trochanteric bursa injected and has been experiencing a considerable amount of pain in the trochanteric bursa today and these will be injected    Medication history was reviewed and it was elected to discontinue the MS Contin and MSIR. Instructions with respect to ERLINDA approved destruction of these medications were provided. It was elected to begin Percocet, 10/325, 4 times daily as needed for her chronic pain. She has a follow-up appointment already scheduled for April and this will be maintained. Review of Systems   Constitutional: Positive for malaise/fatigue. Gastrointestinal: Positive for constipation. Musculoskeletal: Positive for back pain. Neurological: Positive for weakness (generalized). Psychiatric/Behavioral: The patient has insomnia. All other systems reviewed and are negative. Physical Exam   Constitutional: She is oriented to person, place, and time. She appears distressed. HENT:   Head: Normocephalic and atraumatic. Right Ear: External ear normal.   Left Ear: External ear normal.   Nose: Nose normal.   Mouth/Throat: Oropharynx is clear and moist. No oropharyngeal exudate. Eyes: Conjunctivae and EOM are normal. Pupils are equal, round, and reactive to light. Right eye exhibits no discharge. Left eye exhibits no discharge. No scleral icterus. Neck: Normal range of motion. Neck supple. No thyromegaly present. Cardiovascular: Normal rate, regular rhythm and normal heart sounds. Pulmonary/Chest: Effort normal and breath sounds normal. No respiratory distress. She has no wheezes.  She has no rales. Abdominal: Soft. She exhibits no distension. There is no tenderness. There is no rebound and no guarding. Musculoskeletal: She exhibits tenderness. Lumbar back: She exhibits decreased range of motion, tenderness, pain and spasm. Neurological: She is alert and oriented to person, place, and time. She has normal reflexes. No cranial nerve deficit or sensory deficit. She exhibits normal muscle tone. Gait abnormal. Coordination normal.   Reflex Scores:       Tricep reflexes are 2+ on the right side and 2+ on the left side. Bicep reflexes are 2+ on the right side and 2+ on the left side. Brachioradialis reflexes are 2+ on the right side and 2+ on the left side. Patellar reflexes are 2+ on the right side and 2+ on the left side. Achilles reflexes are 2+ on the right side and 2+ on the left side. Skin: Skin is warm. No rash noted. Psychiatric: She has a normal mood and affect. Her behavior is normal. Judgment and thought content normal.       ASSESSMENT and PLAN  Encounter Diagnoses   Name Primary?  Trochanteric bursitis of both hips Yes    Lumbosacral radiculopathy     Encounter for long-term (current) use of medications     Enthesopathy of hip region on both sides     Lumbar post-laminectomy syndrome     Chronic pain syndrome     Sacroiliitis (HCC)      Treatment plan as outlined above. Further recommendations will be based upon her response. No concerns are raised for misuse, abuse, or diversion. 1. Pain medications are prescribed with the objective of pain relief and improved physical and psychosocial function in this patient. 2. Counseled patient on proper use of prescribed medications and reviewed opioid contract. 3. Counseled patient about chronic medical conditions and their relationship to anxiety and depression and recommended mental health support as needed.   4. Reviewed with patient self-help tools, home exercise, and lifestyle changes to assist the patient in self-management of symptoms. 5. Advised patient to have a primary care provider to continue care for health maintenance and general medical conditions and support for referral to specialty care as needed. 6. Reviewed with patient the treatment plan, goals of treatment plan, and limitations of treatment plan, to include the potential for side effects from medications and procedures. If side effects occur, it is the responsibility of the patient to inform the clinic so that a change in the treatment plan can be made in a safe manner. The patient is advised that stopping prescribed medication may cause an increase in symptoms and possible medication withdrawal symptoms. The patient is informed an emergency room evaluation may be necessary if this occurs. DISPOSITION: The patients condition and plan were discussed at length and all questions were answered. The patient agrees with the plan.     Counseling occupied > 50% of visit:  Total time: 40 minutes

## 2017-02-22 NOTE — PROGRESS NOTES
Nursing Notes    Patient presents to the office today in follow-up. Patient rates her pain at 10/10 on the numerical pain scale. Reviewed medications with counts as follows:    Rx Date filled Qty Dispensed Pill Count Last Dose Short   MS Contin 15 2/16/17 80 76 2/18/17 no   MS IR 15 2/16/17 60 34 2/22/17 no         Comments: pt was not able to tolerate either med. She cut the IR's up but still could not stop from vomitting. No UDS done today. Advised to take meds for destruction to [address].

## 2017-02-22 NOTE — PROGRESS NOTES
See scanned report    [address] [place] [address],[address] PAIN MANAGEMENT  OFFICE PROCEDURE PROGRESS NOTE        Chart reviewed for the following:   Lalo Byrne MD, have reviewed the History, Physical and updated the Allergic reactions for [address],  [address] performed immediately prior to start of procedure:   Junior Thompson M.D. have performed the following reviews on Tamara Barone prior to the start of the procedure:            * Patient was identified by name and date of birth   * Agreement on procedure being performed was verified  * Risks and Benefits explained to the patient  * Procedure site verified and marked as necessary  * Patient was positioned for comfort  * Consent was signed and verified     Time: 2:39 PM       Date of procedure: 2/22/2017    Procedure performed by:  Brennan Tucker MD    Assisted by:patient    How tolerated by patient: tolerated the procedure well with no complications    Comments: none     Patient Label  Signature:_____________________________    Indications:   Symptom relief from osteoarthritis    Procedure:  After consent was obtained, using sterile technique the right and left trochanteric bursa was prepped using Chlorprep. Local anesthetic used: ethyl chloride topical.     Kenalog 30 mg was mixed with 0.5% marcaine 5 ml  and injected into the joint and the needle withdrawn. The procedure was well tolerated. The patient is asked to continue to rest the joint for a few more days before resuming regular activities. It may be more painful for the first 1-2 days. Watch for fever, or increased swelling or persistent pain in the joint. Call or return to clinic prn if such symptoms occur or there is failure to improve as anticipated.   ----------------------------------------------------------------------------    PAIN LEVEL AT CONCLUSION OF PROCEDURE:  6/10

## 2017-02-24 ENCOUNTER — OFFICE VISIT (OUTPATIENT)
Dept: PAIN MANAGEMENT | Age: 47
End: 2017-02-24

## 2017-02-24 DIAGNOSIS — Z71.89 COUNSELING AND COORDINATION OF CARE: Primary | ICD-10-CM

## 2017-02-24 NOTE — PROGRESS NOTES
Ms. Se Lee attended the Education Class facilitated by Ephraim Serrano LPN. Objectives of this class are to review practice policies, protocols and the Controlled Substances Consent and Treatment Agreement, discuss \"what if\" scenarios, introduce staff, and provide an opportunity for questions and answers. Ultimately, goals for this class are for Ms. Se Lee to:    · Be educated - Learn as much as possible about her pain and related treatment, our policies and the Controlled Substances Agreement. · Be responsible - Follow the providers advice regarding treatment recommendations, medications, and prescription information. · Be confident - Better manage her pain and return to a more functional lifestyle. At least 45 minutes was spent with the patient in face-to-face contact today.

## 2017-03-02 ENCOUNTER — TELEPHONE (OUTPATIENT)
Dept: PAIN MANAGEMENT | Age: 47
End: 2017-03-02

## 2017-03-02 NOTE — TELEPHONE ENCOUNTER
Received call from patient stating that she had to go to hospital this past week with elevated blood sugar after receiving injections from providers [facility] and [facility]) on different occasions ; patient states that her pcp is concerned that elevated blood sugars may be due to injections; patient is considering cancelling further injections; patient advised to notify office if she does indeed intent to cancel future injections; patient continues to be concerned that injections may have caused elevated blood sugars ; please advise.

## 2017-03-02 NOTE — TELEPHONE ENCOUNTER
Ms. Noman Fowler was contacted for follow-up status post BILATERAL SIJ / BILATERAL TBI on February 21, 2017.  She reports:    Pre-procedure numerical pain score: 9/10  Post-procedure numerical pain score one week after: 4/10  Duration of relief post-procedure (if applicable): 1 weeks  Improvement in functional activities (if applicable): Yes  Percentage of overall improvement: 50%    COMMENTS:

## 2017-03-02 NOTE — TELEPHONE ENCOUNTER
All of her injections have involved steroids which could certainly have raised her blood sugars. Would hold off any further injections for now.

## 2017-03-06 RX ORDER — DIAZEPAM 5 MG/1
10 TABLET ORAL ONCE
Status: CANCELLED | OUTPATIENT
Start: 2017-03-07 | End: 2017-03-07

## 2017-03-06 RX ORDER — SODIUM CHLORIDE 0.9 % (FLUSH) 0.9 %
5-10 SYRINGE (ML) INJECTION AS NEEDED
Status: CANCELLED | OUTPATIENT
Start: 2017-03-07

## 2017-03-14 ENCOUNTER — APPOINTMENT (OUTPATIENT)
Dept: GENERAL RADIOLOGY | Age: 47
End: 2017-03-14
Attending: PHYSICAL MEDICINE & REHABILITATION
Payer: MEDICARE

## 2017-03-14 ENCOUNTER — SURGERY (OUTPATIENT)
Age: 47
End: 2017-03-14

## 2017-03-14 ENCOUNTER — HOSPITAL ENCOUNTER (OUTPATIENT)
Age: 47
Setting detail: OUTPATIENT SURGERY
Discharge: HOME | End: 2017-03-14
Attending: PHYSICAL MEDICINE & REHABILITATION | Admitting: PHYSICAL MEDICINE & REHABILITATION
Payer: MEDICARE

## 2017-03-14 VITALS
HEART RATE: 86 BPM | TEMPERATURE: 98.1 F | OXYGEN SATURATION: 97 % | DIASTOLIC BLOOD PRESSURE: 71 MMHG | HEIGHT: 66 IN | RESPIRATION RATE: 16 BRPM | SYSTOLIC BLOOD PRESSURE: 113 MMHG | BODY MASS INDEX: 35.03 KG/M2 | WEIGHT: 218 LBS

## 2017-03-14 LAB — GLUCOSE BLD STRIP.AUTO-MCNC: 115 MG/DL (ref 70–110)

## 2017-03-14 PROCEDURE — 74011250636 HC RX REV CODE- 250/636

## 2017-03-14 PROCEDURE — 74011000250 HC RX REV CODE- 250

## 2017-03-14 PROCEDURE — 76010000009 HC PAIN MGT 0 TO 30 MIN PROC: Performed by: PHYSICAL MEDICINE & REHABILITATION

## 2017-03-14 PROCEDURE — 74011250637 HC RX REV CODE- 250/637: Performed by: PHYSICAL MEDICINE & REHABILITATION

## 2017-03-14 PROCEDURE — 77030003672 HC NDL SPN HALY -A: Performed by: PHYSICAL MEDICINE & REHABILITATION

## 2017-03-14 PROCEDURE — 77030003666 HC NDL SPINAL BD -A: Performed by: PHYSICAL MEDICINE & REHABILITATION

## 2017-03-14 PROCEDURE — 82962 GLUCOSE BLOOD TEST: CPT

## 2017-03-14 PROCEDURE — 74011000250 HC RX REV CODE- 250: Performed by: PHYSICAL MEDICINE & REHABILITATION

## 2017-03-14 PROCEDURE — 74011250636 HC RX REV CODE- 250/636: Performed by: PHYSICAL MEDICINE & REHABILITATION

## 2017-03-14 RX ORDER — METFORMIN HYDROCHLORIDE 500 MG/1
500 TABLET ORAL 2 TIMES DAILY WITH MEALS
COMMUNITY

## 2017-03-14 RX ORDER — DIAZEPAM 5 MG/1
5-20 TABLET ORAL ONCE
Status: COMPLETED | OUTPATIENT
Start: 2017-03-14 | End: 2017-03-14

## 2017-03-14 RX ORDER — LIDOCAINE HYDROCHLORIDE 10 MG/ML
INJECTION, SOLUTION EPIDURAL; INFILTRATION; INTRACAUDAL; PERINEURAL AS NEEDED
Status: DISCONTINUED | OUTPATIENT
Start: 2017-03-14 | End: 2017-03-14 | Stop reason: HOSPADM

## 2017-03-14 RX ORDER — TRIAMCINOLONE ACETONIDE 40 MG/ML
INJECTION, SUSPENSION INTRA-ARTICULAR; INTRAMUSCULAR AS NEEDED
Status: DISCONTINUED | OUTPATIENT
Start: 2017-03-14 | End: 2017-03-14 | Stop reason: HOSPADM

## 2017-03-14 RX ADMIN — LIDOCAINE HYDROCHLORIDE 14 ML: 10 INJECTION, SOLUTION EPIDURAL; INFILTRATION; INTRACAUDAL; PERINEURAL at 10:58

## 2017-03-14 RX ADMIN — DIAZEPAM 15 MG: 5 TABLET ORAL at 09:28

## 2017-03-14 RX ADMIN — TRIAMCINOLONE ACETONIDE 120 MG: 40 INJECTION, SUSPENSION INTRA-ARTICULAR; INTRAMUSCULAR at 10:59

## 2017-03-14 NOTE — DISCHARGE INSTRUCTIONS
[address] [place] [address] for Pain Management      Post Procedures Instructions    *Resume Diet and Activity as tolerated. Rest for the remainder of the day. *You may fell worse before you feel better as the numbing medications wear off before the steroids take effect if used for your procedures. *Do not use affected extremity until numbness or loss of sensation has completely resolved without assistance. *DO NOT DRIVE, operate machinery/heavey equipment for 24 hours. *DO NOT DRINK ALCOHOL for 24 hours as it may interact with the sedation if you received it and also thins your blood and may cause you to bleed. *WAIT 24 hours before starting back ANY Blood thinning medications:   (Heparin, Coumadin, Warfarin, Lovenox, Plavix, Aggrenox)    *Resume Pre-Procedure Medications as prescribed except Blood Thinners unless directed by your Physician or Cardiologist.     *Avoid Hot tubs and Heating pad for 24 hours to prevent dissipation of medications, you may shower to remove bandages and remaining prep residue on the skin. * If you develop a Headache, drink plenty of fluids including beverages with caffeine (Coffee, Mt. Dew etc.) and rest.  If the headache persists longer than 24 hoursor intensifies - Please call [facility] [phone]    * If you are DIABETIC, check your blood sugar three times a day for the next three days, the steroids will increase your blood sugar. If your blood sugar is greater than 400 have someone drive you to the nearest [address]. * If you experience any of the following problems, call the [facility] [phone] between 8:00 am - 4:30pm or After Hours [phone].     Shortness of breath    Fever of 101 F or higher    Nausea / Vomiting (not normal to you)    Increasing stiffness in the neck    Weakness or numbness in the arms or legs that is not resolving    Prolonged and increasing pain > than 4 days    ANYTHING OUT of the ORDINARY TO YOU    If YOU are experiencing a severe reaction / complication that you have never had before post procedure, call 911 or go to the nearest emergency room! All patients must have a  for transportation [place] regardless if you do or do not receive sedation. DISCHARGE SUMMARY from Nurse      PATIENT INSTRUCTIONS:    After Oral  or intravenous sedation, for 24 hours or while taking prescription Narcotics:  · Limit your activities  · Do not drive and operate hazardous machinery  · Do not make important personal or business decisions  · Do  not drink alcoholic beverages  · If you have not urinated within 8 hours after discharge, please contact your surgeon on call. Report the following to your surgeon:  · Excessive pain, swelling, redness or odor of or around the surgical area  · Temperature over 101  · Nausea and vomiting lasting longer than 4 hours or if unable to take medications  · Any signs of decreased circulation or nerve impairment to extremity: change in color, persistent  numbness, tingling, coldness or increase pain  · Any questions        What to do at Home:  Recommended activity: Activity as tolerated, NO DRIVING FOR 24 Hours post injection          *  Please give a list of your current medications to your Primary Care Provider. *  Please update this list whenever your medications are discontinued, doses are      changed, or new medications (including over-the-counter products) are added. *  Please carry medication information at all times in case of emergency situations. These are general instructions for a healthy lifestyle:    No smoking/ No tobacco products/ Avoid exposure to second hand smoke    Surgeon General's Warning:  Quitting smoking now greatly reduces serious risk to your health.     Obesity, smoking, and sedentary lifestyle greatly increases your risk for illness    A healthy diet, regular physical exercise & weight monitoring are important for maintaining a healthy lifestyle    You may be retaining fluid if you have a history of heart failure or if you experience any of the following symptoms:  Weight gain of 3 pounds or more overnight or 5 pounds in a week, increased swelling in our hands or feet or shortness of breath while lying flat in bed. Please call your doctor as soon as you notice any of these symptoms; do not wait until your next office visit. Recognize signs and symptoms of STROKE:    F-face looks uneven    A-arms unable to move or move unevenly    S-speech slurred or non-existent    T-time-call 911 as soon as signs and symptoms begin-DO NOT go       Back to bed or wait to see if you get better-TIME IS BRAIN.

## 2017-03-14 NOTE — PROCEDURES
[facility]    SACROILIAC JOINT, [facility] INJECTIONS   PROCEDURE REPORT      PATIENT:  Roberto Johnson OF BIRTH:  1970  DATE OF SERVICE:  3/14/2017  [facility]    PRE-PROCEDURE DIAGNOSIS:  See Above    POST-PROCEDURE DIAGNOSIS:  See Above                PROCEDURE:    1. Bilateral sacroiliac joint injection (41198, 50 )  2. Fluoroscopic needle guidance (spinal) (68305)  3. Bilateral greater trochanteric bursae injection (60293, 50 )  4. Fluoroscopic needle guidance (non-spinal) (91578)    ANESTHESIA:  See Medication Administration Record    COMPLICATIONS: None. PHYSICIAN:  Allyson Dior MD    PRE-PROCEDURE NOTE:  Pre-procedural assessment of the patient was performed including a limited history and physical examination. The details of the procedure were discussed with the patient, including the risks, benefits and alternative options and an informed consent was obtained. The patients NPO status, if necessary for the specific procedure and/or administration of moderate intravenous sedation, if utilized, and availability of a responsible adult to escort the patient following the procedure were confirmed. PROCEDURE NOTE:  The patient was brought to the procedure suite and positioned on the fluoroscopy table in the prone position. Physiologic monitors were applied and supplemental oxygen was administered via nasal cannula. The skin was prepped in the standard surgical fashion and sterile drapes were applied over the procedure site. Please refer to the Flowsheet for documentation of the patients vital signs and the Medication Administration Record for any oral and/or intravenous sedation administered prior to or during the procedure. 1% Lidocaine was utilized for local anesthesia.   Under slight contralateral oblique fluoroscopic guidance, a 22-gauge, 3.5-inch short bevel spinal needle was advanced into the SI joint from the posteriomedial approach. Following this, 3 mL of a solution comprised of 1 mL of triamcinolone (40 mg/mL) and 2 mL of 1% lidocaine was injected after negative aspiration of blood, air, or fluid. The needle was restiletted and removed intact. The procedure was repeated on the contralateral side in the same fashion. Attention was then directed to the greater trochanter. Under AP fluoroscopic guidance, a 25g, 3.5-inch short bevel needle was advanced from lateral to medial, until the greater trochanter was contacted at the trochanteric bursa. Following this, 4 mL of a solution comprised of 3.5 mL of 1% lidocaine and 0.5 mL of triamcinolone (10 mg/mL) was injected slowly after negative aspiration of blood. The needle was restiletted and removed intact. The procedure was repeated on the contralateral side in the same fashion. The needle was removed intact. The area was thoroughly cleaned and sterile bandages applied as necessary. The patient tolerated the procedure well and vital signs remained stable throughout the procedure. POST-PROCEDURE COURSE:   The patient was escorted from the procedure suite in satisfactory condition and recovered per facility protocol based on the type of procedure performed and/or the sedation utilized. The patient did not experience any adverse events and remained hemodynamically stable during the post-procedure period. DISCHARGE NOTE:  Upon discharge, the patient was able to tolerate fluids and was in no acute distress. The patient was oriented to person, place and time and vital signs were stable. Appropriate post-procedure instructions were provided and explained to the patient in detail and all questions were answered.     Juanjo Bass MD 3/14/2017 11:09 AM

## 2017-03-14 NOTE — H&P (VIEW-ONLY)
Ms. Wali Singh attended the Education Class facilitated by Andrea Downey LPN. Objectives of this class are to review practice policies, protocols and the Controlled Substances Consent and Treatment Agreement, discuss \"what if\" scenarios, introduce staff, and provide an opportunity for questions and answers. Ultimately, goals for this class are for Ms. Wali Singh to:    · Be educated - Learn as much as possible about her pain and related treatment, our policies and the Controlled Substances Agreement. · Be responsible - Follow the providers advice regarding treatment recommendations, medications, and prescription information. · Be confident - Better manage her pain and return to a more functional lifestyle. At least 45 minutes was spent with the patient in face-to-face contact today.

## 2017-03-14 NOTE — INTERVAL H&P NOTE
H&P Update: Pelon Cisneros was seen and examined. History and physical has been reviewed. The patient has been examined.  There have been no significant clinical changes since the completion of the originally dated History and Physical.    Signed By: Brian Lee MD     March 14, 2017 9:17 AM

## 2017-03-22 ENCOUNTER — OFFICE VISIT (OUTPATIENT)
Dept: PAIN MANAGEMENT | Age: 47
End: 2017-03-22

## 2017-03-22 VITALS — HEART RATE: 81 BPM | SYSTOLIC BLOOD PRESSURE: 114 MMHG | DIASTOLIC BLOOD PRESSURE: 76 MMHG

## 2017-03-22 DIAGNOSIS — M53.3 COCCYGODYNIA: Primary | ICD-10-CM

## 2017-03-22 DIAGNOSIS — M96.1 LUMBAR POST-LAMINECTOMY SYNDROME: ICD-10-CM

## 2017-03-22 DIAGNOSIS — M46.1 SACROILIITIS (HCC): ICD-10-CM

## 2017-03-22 DIAGNOSIS — G89.4 CHRONIC PAIN SYNDROME: ICD-10-CM

## 2017-03-22 DIAGNOSIS — M76.899 ENTHESOPATHY OF HIP REGION, UNSPECIFIED LATERALITY: ICD-10-CM

## 2017-03-22 DIAGNOSIS — M54.17 LUMBOSACRAL RADICULOPATHY: ICD-10-CM

## 2017-03-22 NOTE — PROGRESS NOTES
[address] [facility]  Interventional Pain Management Consultation History & Physical    PATIENT NAME:  Donny Jones     YOB: 1970    DATE OF SERVICE:   3/22/2017      CHIEF COMPLAINT:  Back Pain; Hip Pain; and Sacrum Pain      HISTORY OF PRESENT ILLNESS:   Donny Jones presents to the pain clinic today for follow on evaluation and to consider interventional pain management options as indicated for the type and location of the pain the patient is presenting with. Donny Jones patient returns for follow-up evaluation after her series of 2 bilateral sacroiliac and trochanteric bursa injections. Her most recent set of injections was March 14, 2017. She states that first set of injections helped her significantly, second set helped her approximately 50%. She is now presenting with a different pain management issue. Patient today endorses she is having unbearable tailbone and coccyx pain. She relates that she fell onto her tailbone some time ago, she feels like she bruised her tailbone severely. She is also noted to be status post lumbar fusion L4, L5, S1 levels. Hardware placed bilaterally at these levels. Patient has low back, radiating hip and radiating tailbone pain. She occasionally gets pain down the back of her legs to above the level of the knees. Pain is increased with sitting especially on her tailbone. Pain is increased also with prolonged sitting standing and walking. Medications continue to offer some benefit. Procedures that we have done so far also offer some benefit      We reviewed her lumbar MRI. This is significant for postsurgical changes L4-S1 with pedicle screws and hardware. Hardware appears stable with no hardware failure or fractures noted. Mild degenerative changes both above and below fused levels. We discussed options.   I discussed with the patient risk and benefits, indications contraindications and side effects of series of ganglion of impar blocks with IV conscious sedation. I believe these are very worthwhile trying for this patient who has refractory coccygodynia . If her low back pain, bilateral hip pain and coccyx pain continues unabated, I will also mention to the patient risk and benefits, indications contraindications and side effects of possibly performing spinal cord stimulator trial placement. Patient already has a DVD on spinal cord stimulators. She has reviewed the DVD already. She is already attended informational class on spinal cord stimulators, she is very interested in possibly pursuing spinal cord stimulator trial.  I have not sent her as yet to see our pain psychologist.  This may be the next step. MRI: Reviewed imaging using lumbar spine model    PROCEDURES: Discussed ganglion of impar block. Discussed spinal cord stimulator trial placement. MRI Results (most recent):  No results found for this or any previous visit. PAST MEDICAL HISTORY:   The patient  has a past medical history of Acid reflux; Bipolar 1 disorder ([address] Depression; Essential hypertension; Heart attack ([facility] [address] (2014); and Vision decreased. PAST SURGICAL HISTORY:   The patient  has a past surgical history that includes cardiac surg procedure unlist (05/22/2014); gyn (1998); orthopaedic; orthopaedic; orthopaedic; appendectomy; abdomen surgery proc unlisted; and other surgical.    CURRENT MEDICATIONS:   The patient has a current medication list which includes the following prescription(s): metformin, oxycodone-acetaminophen, alprazolam, oxybutynin, ropinirole, estradiol, topiramate er, clonazepam, lamotrigine, clonidine hcl, paroxetine, lisinopril, and aspirin.     ALLERGIES:     Allergies   Allergen Reactions    Iodine Anaphylaxis    Shellfish Derived Anaphylaxis    Codeine Hives    Haldol [Haloperidol Lactate] Other (comments)     violent    Imitrex [Sumatriptan] Swelling    Penicillins Hives    Ultram [Tramadol] Hives       FAMILY HISTORY:   The patient family history includes Diabetes in her maternal grandmother and sister; Hypertension in her father, maternal grandfather, maternal grandmother, and mother. SOCIAL HISTORY:   The patient  reports that she quit smoking about 2 years ago. Her smoking use included Cigarettes. She does not have any smokeless tobacco history on file. The patient  reports that she does not drink alcohol. She also  reports that she uses illicit drugs, including Marijuana and Hallucinogenics. REVIEW OF SYSTEMS:   The patient denies fever, chills, weight loss (Constitutional), rash, itching (Skin), tinnitus, congestion (HENT), blurred vision, photophobia (Eyes), palpitations, orthopnea (Cardiovascular), hemoptysis, wheezing (Respiratory), nausea, vomiting, diarrhea (Gastrointestinal), dysuria, hematuria, urgency (Genitourinary), easy bruising, bleeding abnormalities (Hematologic), bowel or bladder incontinence, loss of consciousness (Neurologic), suicidal or homicidal ideation or hallucinations (Psychiatric). PHYSICAL EXAM:  VS:   Visit Vitals    /76 (BP 1 Location: Right arm, BP Patient Position: Sitting)    Pulse 81     General: Well-developed and well-nourished. Body habitus consistent with recorded height and weight and the calculated BMI. Apparent distress due to low back and tailbone pain. Head: Normocephalic, atraumatic. Skin: Inspection of the skin reveals no rashes, lesions or infection. CV: Regular rate. No murmurs or rubs noted. No peripheral edema noted. Pulm: Respirations are even and unlabored. Extr: No clubbing, cyanosis, or edema noted. Musculoskeletal:  1. Cervical spine - Full ROM. No paraspinous tenderness at any level. There is no scoliosis, asymmetry, or musculoskeletal defect. 2. Thoracic spine - Full ROM. No paraspinous tenderness at any level. There is no scoliosis, asymmetry, or musculoskeletal defect.   3. Lumbar spine -slightly decreased range of motion all axes . Paraspinous tenderness at bilateral lumbar levels . SI joints are nontender bilaterally. There is no scoliosis, asymmetry, or musculoskeletal defect. Coccyx tender to palpation. 4. Right upper extremity - Full ROM. 5/5 muscle strength in all muscle groups. No pain or tenderness in shoulder, elbow, wrist, or hand. 5. Left upper extremity - Full ROM. 5/5 muscle strength in all muscle groups. No pain or tenderness in shoulder, elbow, wrist, or hand. 6. Right lower extremity - Full ROM. 5/5 muscle strength in all muscle groups. No pain, tenderness, or swelling in the hip, knee, ankle or foot. 7. Left lower extremity - Full ROM. 5/5 muscle strength in all muscle groups. No pain, tenderness, or swelling in the hip, knee, ankle or foot. Neurological:  1. Mental Status - Alert, awake and oriented. Speech is clear and appropriate. 2. Cranial Nerves - Extraocular muscles intact bilaterally. Cranial nerves II-XII grossly intact bilaterally. 3. Gait - antalgic   4. Reflexes - 2+ and symmetric throughout. 5. Sensation - Intact to light touch and pin prick. 6. Provocative Tests - Spurlings negative bilaterally. Straight leg raise negative bilaterally. Psychological:  1. Mood and affect - Appropriate. 2. Speech - Appropriate. 3. Though content - Appropriate. 4. Judgment - Appropriate. ASSESSMENT:      ICD-10-CM ICD-9-CM    1. Coccygodynia M53.3 724.79    2. Sacroiliitis (HCC) M46.1 720.2    3. Enthesopathy of hip region, unspecified laterality M76.899 726.5    4. Lumbosacral radiculopathy M54.17 724.4    5. Lumbar post-laminectomy syndrome M96.1 722.83    6. Chronic pain syndrome G89.4 338.4            PLAN:    1. Diagnoses/Plan: Coccygodynia, sacroiliitis, lumbosacral radiculopathy, lumbar postlaminectomy syndrome. Chronic pain syndrome. We discussed options.   I discussed with the patient risk and benefits, indications contraindications and side effects of series of ganglion of impar blocks with IV conscious sedation. I believe these are very worthwhile trying for this patient who has refractory coccygodynia . If her low back pain, bilateral hip pain and coccyx pain continues unabated, I will also mention to the patient risk and benefits, indications contraindications and side effects of possibly performing spinal cord stimulator trial placement. Patient already has a DVD on spinal cord stimulators. She has reviewed the DVD already. She is already attended informational class on spinal cord stimulators, she is very interested in possibly pursuing spinal cord stimulator trial.  I have not sent her as yet to see our pain psychologist.  This may be the next step. 2.    I have thoroughly discussed the risks and benefits, side effects and complications, of any and all procedures that were mentioned at today's patient visit. I have used a skeleton model for added emphasis and patient education. I have answered all questions, and I have obtained verbal confirmation for all procedures planned with the patient. 3.    I have reviewed in great detail today the patient's MRI and other imaging studies with the patient. I have explained to the patient their condition using both actual recent and relevant images. I have used a skeleton model for added emphasis as well as patient education. 4.    I have advised patient to have a primary care provider to continue care for health maintenance and general medical conditions and support for referral to specialty care as needed. 5.    I have reviewed with patient the treatment plan, goals of treatment plan, and limitations of treatment plan, to include the potential for side effects from medications and procedures. If side effects occur, it is the responsibility of the patient to inform the clinic so that a change in the treatment plan can be made in a safe manner.  The patient is advised that stopping prescribed medication may cause an increase in symptoms and possible medication withdrawal symptoms. The patient is informed an emergency room evaluation may be necessary if this occurs. DISPOSITION:   The patients condition and plan were discussed at length and all questions were answered. The patient agrees with the plan. A total of 25 minutes was spent with the patient of which over half of the time was spent counseling the patient. Mariela Lewis MD 3/22/2017 6:55 PM    Note: Although these clinic notes were documented by the provider at the time of the exam, they have not been proofed and are subject to transcription variance.

## 2017-03-31 ENCOUNTER — OFFICE VISIT (OUTPATIENT)
Dept: PAIN MANAGEMENT | Age: 47
End: 2017-03-31

## 2017-03-31 VITALS
BODY MASS INDEX: 35.19 KG/M2 | WEIGHT: 218 LBS | HEART RATE: 81 BPM | DIASTOLIC BLOOD PRESSURE: 73 MMHG | SYSTOLIC BLOOD PRESSURE: 109 MMHG

## 2017-03-31 DIAGNOSIS — Z79.899 ENCOUNTER FOR LONG-TERM (CURRENT) USE OF MEDICATIONS: ICD-10-CM

## 2017-03-31 DIAGNOSIS — M46.1 SACROILIITIS (HCC): ICD-10-CM

## 2017-03-31 DIAGNOSIS — G89.4 CHRONIC PAIN SYNDROME: ICD-10-CM

## 2017-03-31 DIAGNOSIS — M76.891 ENTHESOPATHY OF HIP REGION ON BOTH SIDES: ICD-10-CM

## 2017-03-31 DIAGNOSIS — M79.7 FIBROMYALGIA: ICD-10-CM

## 2017-03-31 DIAGNOSIS — M54.17 LUMBOSACRAL RADICULOPATHY: ICD-10-CM

## 2017-03-31 DIAGNOSIS — G43.709 CHRONIC MIGRAINE WITHOUT AURA WITHOUT STATUS MIGRAINOSUS, NOT INTRACTABLE: ICD-10-CM

## 2017-03-31 DIAGNOSIS — M76.899 ENTHESOPATHY OF HIP REGION, UNSPECIFIED LATERALITY: ICD-10-CM

## 2017-03-31 DIAGNOSIS — M96.1 LUMBAR POST-LAMINECTOMY SYNDROME: ICD-10-CM

## 2017-03-31 DIAGNOSIS — M76.892 ENTHESOPATHY OF HIP REGION ON BOTH SIDES: ICD-10-CM

## 2017-03-31 DIAGNOSIS — M53.3 COCCYGODYNIA: Primary | ICD-10-CM

## 2017-03-31 RX ORDER — OXYCODONE AND ACETAMINOPHEN 10; 325 MG/1; MG/1
1 TABLET ORAL
Qty: 90 TAB | Refills: 0 | Status: SHIPPED | OUTPATIENT
Start: 2017-04-16 | End: 2017-04-27 | Stop reason: SDUPTHER

## 2017-03-31 NOTE — PROGRESS NOTES
Nursing Notes    Patient presents to the office today in follow-up. Patient rates her pain at 7/10 on the numerical pain scale. Reviewed medications with counts as follows:    Rx Date filled Qty Dispensed Pill Count Last Dose Short     Percocet 10/325mg 03/18/17 120 58 Today  Yes ( one  day short + leighton day)                                             Comments:     POC UDS was not performed in office today    Any new labs or imaging since last appointment? NO    Have you been to an emergency room (ER) or urgent care clinic since your last visit? NO            Have you been hospitalized since your last visit? NO     If yes, where, when, and reason for visit? Have you seen or consulted any other health care providers outside of the [address] [place] [address]  since your last visit? NO     If yes, where, when, and reason for visit? HM deferred to pcp. Patient noted to be short on medications , percocet 10/325mg (n one day short  + leighton day )  this office visit; advised to follow dosing schedule [facility].

## 2017-03-31 NOTE — PROGRESS NOTES
HISTORY OF PRESENT ILLNESS  Dakota Galvan is a 41 y.o. female. Patient presents for follow up of chronic pain due to coccydynia, sacroiliitis, trochanteric bursitis, and lumbar postlaminectomy syndrome. She presents acutely due to medication issues. She reports that the change to percocet 10/325 has been well-controlled compared to ms contin (which produced severe side effects), however, the pain relief has not been adequate. We discussed the risks and limitations of using opioids to treat chronic pain, particularly with the myofascial components of her pain. She is unable to use NSAIDs, noting that she had a heart attack in 2014 which was resultant from using Naproxyn chronically for many years. She is in the process of losing weight and trying to exercise. We discussed the critical role of exercise in the management of osteoarthritic-related fatigue and pain. We discussed expectation management of treatment chronic pain as well in great detail. She has tried and failed morphine, hydrocodone, and oxycodone and tramadol. Pt reports average of 7-8/10 pain scale most days. The patient reports an average of 20% relief with this regimen. .   After prolonged discussion, she elects to change to Nucynta ER for chronic, severe, refractory pain. We will start with 100 mg twice daily. Percocet will be retained up to three times daily as needed for chronic pain. One month follow up will be arranged. A total of 45 minutes was spent with the patient of which more than 50% of the time was spent counseling the patient. HPI --see above    ROS  Constitutional: Positive for malaise/fatigue. Gastrointestinal: Positive for constipation. Musculoskeletal: Positive for back pain. Neurological: Positive for weakness (generalized). Psychiatric/Behavioral: The patient has insomnia. All other systems reviewed and are negative. Physical Exam  Constitutional: She is oriented to person, place, and time.  She appears distressed. HENT:   Head: Normocephalic and atraumatic. Right Ear: External ear normal.   Left Ear: External ear normal.   Nose: Nose normal.   Mouth/Throat: Oropharynx is clear and moist. No oropharyngeal exudate. Eyes: Conjunctivae and EOM are normal. Pupils are equal, round, and reactive to light. Right eye exhibits no discharge. Left eye exhibits no discharge. No scleral icterus. Neck: Normal range of motion. Neck supple. Cardiovascular: Normal rate, regular rhythm and normal heart sounds. Pulmonary/Chest: Effort normal and breath sounds normal. No respiratory distress. She has no wheezes. She has no rales. Abdominal: Soft. She exhibits no distension. There is no tenderness. There is no rebound and no guarding. Musculoskeletal: She exhibits tenderness. Lumbar back: She exhibits decreased range of motion, tenderness, pain and spasm. Neurological: She is alert and oriented to person, place, and time. She has normal reflexes. No cranial nerve deficit or sensory deficit. She exhibits normal muscle tone. Gait abnormal. Coordination normal.   Reflex Scores:       Tricep reflexes are 2+ on the right side and 2+ on the left side. Bicep reflexes are 2+ on the right side and 2+ on the left side. Brachioradialis reflexes are 2+ on the right side and 2+ on the left side. Patellar reflexes are 2+ on the right side and 2+ on the left side. Achilles reflexes are 2+ on the right side and 2+ on the left side. Skin: Skin is warm. No rash noted. Psychiatric: She has a normal mood and affect. Her behavior is normal. Judgment and thought content normal.   ASSESSMENT and PLAN    ICD-10-CM ICD-9-CM    1. Coccygodynia M53.3 724.79    2. Fibromyalgia M79.7 729.1    3. Lumbosacral radiculopathy M54.17 724.4    4. Encounter for long-term (current) use of medications Z79.899 V58.69    5. Enthesopathy of hip region on both sides M76.891 726.5     M76.892     6.  Lumbar post-laminectomy syndrome M96.1 722.83    7. Chronic pain syndrome G89.4 338.4    8. Sacroiliitis (HCC) M46.1 720.2    9. Enthesopathy of hip region, unspecified laterality M76.899 726.5    10. Chronic migraine without aura without status migrainosus, not intractable G43.709 346.70       Plan:   We will change Nucynta 100 mg every twelve hours for chronic pain  We will retain percocet 10/325 up to three times daily as needed for breakthrough pain  Do not self increase for any reason  Follow up in 1 months or sooner if needed  Regular exercise and attention to emotional health and diet remain the most effective ways to treat chronic pain of all kinds  You may contact me with questions or concerns through [employer]

## 2017-03-31 NOTE — MR AVS SNAPSHOT
Visit Information Date & Time Provider Department Dept. Phone Encounter #  
 3/31/2017  3:40 PM Dixie Hutchison [facility] [id number] Follow-up Instructions Return in about 1 month (around 4/30/2017). Follow-up and Disposition History Your Appointments 4/27/2017  9:00 AM  
Follow Up with Shira Dawson PA-C [facility] (SARBJIT SCHEDULING) Appt Note: return in  1 month [address] [address]  
[phone]  [phone] Upcoming Health Maintenance Date Due DTaP/Tdap/Td series (1 - Tdap) 1/30/1993 PAP AKA CERVICAL CYTOLOGY 1/30/1993 INFLUENZA AGE 9 TO ADULT 8/1/2016 Allergies as of 3/31/2017  Review Complete On: 3/31/2017 By: Jens Francis LPN Severity Noted Reaction Type Reactions Iodine High 02/15/2017    Anaphylaxis Shellfish Derived High 02/21/2017    Anaphylaxis Codeine  02/15/2017    Hives Haldol [Haloperidol Lactate]  02/15/2017    Other (comments)  
 violent Imitrex [Sumatriptan]  02/15/2017    Swelling Penicillins  02/15/2017    Hives Ultram [Tramadol]  02/15/2017    Hives Current Immunizations  Never Reviewed No immunizations on file. Not reviewed this visit You Were Diagnosed With   
  
 Codes Comments Coccygodynia    -  Primary ICD-10-CM: M53.3 ICD-9-CM: 724.79 Fibromyalgia     ICD-10-CM: M79.7 ICD-9-CM: 729.1 Lumbosacral radiculopathy     ICD-10-CM: M54.17 ICD-9-CM: 724.4 Encounter for long-term (current) use of medications     ICD-10-CM: Z79.899 ICD-9-CM: V58.69 Enthesopathy of hip region on both sides     ICD-10-CM: M76.891, K78.221 ICD-9-CM: 726.5 Lumbar post-laminectomy syndrome     ICD-10-CM: M96.1 ICD-9-CM: 722.83 Chronic pain syndrome     ICD-10-CM: G89.4 ICD-9-CM: 338.4 Sacroiliitis ([facility] [address]     ICD-10-CM: M46.1 ICD-9-CM: 720.2 Enthesopathy of hip region, unspecified laterality     ICD-10-CM: M76.899 ICD-9-CM: 726.5 Chronic migraine without aura without status migrainosus, not intractable     ICD-10-CM: J80.164 ICD-9-CM: 346.70 Vitals BP Pulse Weight(growth percentile) BMI OB Status Smoking Status 109/73 81 218 lb (98.9 kg) 35.19 kg/m2 Hysterectomy Former Smoker Vitals History BMI and BSA Data Body Mass Index Body Surface Area  
 35.19 kg/m 2 2.15 m 2 Preferred Pharmacy Pharmacy Name Phone [facility], [address] [place] [address] [phone] Your Updated Medication List  
  
   
This list is accurate as of: 3/31/17  5:10 PM.  Always use your most recent med list.  
  
  
  
  
 aspirin 325 mg tablet Generic drug:  aspirin Take 325 mg by mouth daily. clonazePAM 2 mg tablet Commonly known as:  Mallard Boop Take  by mouth two (2) times a day. cloNIDine HCl 0.1 mg tablet Commonly known as:  CATAPRES Take  by mouth two (2) times a day. estradiol 0.5 mg tablet Commonly known as:  ESTRACE Take  by mouth daily. lamoTRIgine 200 mg tablet Commonly known as: LaMICtal  
Take  by mouth daily. lisinopril 20 mg tablet Commonly known as:  Donnald Code Take  by mouth daily. metFORMIN 500 mg tablet Commonly known as:  GLUCOPHAGE Take 500 mg by mouth two (2) times daily (with meals). oxybutynin 5 mg tablet Commonly known as:  GOCLWTOH Take 5 mg by mouth three (3) times daily. oxyCODONE-acetaminophen  mg per tablet Commonly known as:  PERCOCET Take 1 Tab by mouth three (3) times daily as needed for Pain for up to 30 days. Max Daily Amount: 3 Tabs. Indications: Pain Start taking on:  4/16/2017 PARoxetine 30 mg tablet Commonly known as:  PAXIL Take 30 mg by mouth daily. rOPINIRole 0.5 mg tablet Commonly known as:  Tony Bleak Take  by mouth three (3) times daily. tapentadol  mg tablet Commonly known as:  Gabriela Rubin ER Take 1 Tab by mouth two (2) times a day. Max Daily Amount: 200 mg. for chronic, severe, refractory pain TROKENDI  mg capsule Generic drug:  topiramate ER Take  by mouth daily. XANAX 1 mg tablet Generic drug:  ALPRAZolam  
Take  by mouth. Prescriptions Printed Refills  
 oxyCODONE-acetaminophen (PERCOCET)  mg per tablet 0 Starting on: 4/16/2017 Sig: Take 1 Tab by mouth three (3) times daily as needed for Pain for up to 30 days. Max Daily Amount: 3 Tabs. Indications: Pain Class: Print Route: Oral  
 tapentadol ER (NUCYNTA ER) 100 mg tablet 0 Sig: Take 1 Tab by mouth two (2) times a day. Max Daily Amount: 200 mg. for chronic, severe, refractory pain  
 Class: Print Route: Oral  
  
Follow-up Instructions Return in about 1 month (around 4/30/2017). Introducing [facility]! Norberto Craig introduces Appirio patient portal. Now you can access parts of your medical record, email your doctor's office, and request medication refills online. 1. In your internet browser, go to [email or web address] 2. Click on the First Time User? Click Here link in the Sign In box. You will see the New Member Sign Up page. 3. Enter your Appirio Access Code exactly as it appears below. You will not need to use this code after youve completed the sign-up process. If you do not sign up before the expiration date, you must request a new code. · Appirio Access Code: [email or web address] Expires: 5/4/2017 10:13 AM 
 
4. Enter the last four digits of your Social Security Number (xxxx) and Date of Birth (mm/dd/yyyy) as indicated and click Submit. You will be taken to the next sign-up page. 5. Create a Appirio ID.  This will be your Appirio login ID and cannot be changed, so think of one that is secure and easy to remember. 6. Create a VHSquared password. You can change your password at any time. 7. Enter your Password Reset Question and Answer. This can be used at a later time if you forget your password. 8. Enter your e-mail address. You will receive e-mail notification when new information is available in [address] 9. Click Sign Up. You can now view and download portions of your medical record. 10. Click the Download Summary menu link to download a portable copy of your medical information. If you have questions, please visit the Frequently Asked Questions section of the VHSquared website. Remember, VHSquared is NOT to be used for urgent needs. For medical emergencies, dial 911. Now available from your iPhone and Android! Please provide this summary of care documentation to your next provider. Your primary care clinician is listed as TAYE GASCA. If you have any questions after today's visit, please call [phone].

## 2017-04-04 ENCOUNTER — TELEPHONE (OUTPATIENT)
Dept: PAIN MANAGEMENT | Age: 47
End: 2017-04-04

## 2017-04-04 DIAGNOSIS — M54.17 LUMBOSACRAL RADICULOPATHY: Primary | ICD-10-CM

## 2017-04-04 NOTE — TELEPHONE ENCOUNTER
Susie Parsons was denied by [facility] medications are mser (tried), fentanyl, opana er and embeda - all may still require a pa.

## 2017-04-04 NOTE — PROGRESS NOTES
[facility]'s new formulary refuses to cover Vene 89 ER until the patient tries and fails (in addition to her already trialed meds) fentanyl and Opana ER. We will change to Opana ER 10 mg BID with follow up in three weeks. Pt will come in to  RX. [facility] so she can start it today.

## 2017-04-27 ENCOUNTER — OFFICE VISIT (OUTPATIENT)
Dept: PAIN MANAGEMENT | Age: 47
End: 2017-04-27

## 2017-04-27 VITALS — RESPIRATION RATE: 16 BRPM | DIASTOLIC BLOOD PRESSURE: 68 MMHG | HEART RATE: 64 BPM | SYSTOLIC BLOOD PRESSURE: 96 MMHG

## 2017-04-27 DIAGNOSIS — M53.3 COCCYDYNIA: ICD-10-CM

## 2017-04-27 DIAGNOSIS — M46.1 SACROILIITIS (HCC): ICD-10-CM

## 2017-04-27 DIAGNOSIS — M76.891 ENTHESOPATHY OF RIGHT HIP REGION: ICD-10-CM

## 2017-04-27 DIAGNOSIS — M54.17 LUMBOSACRAL RADICULOPATHY: ICD-10-CM

## 2017-04-27 DIAGNOSIS — G89.4 CHRONIC PAIN SYNDROME: ICD-10-CM

## 2017-04-27 DIAGNOSIS — M96.1 LUMBAR POST-LAMINECTOMY SYNDROME: ICD-10-CM

## 2017-04-27 DIAGNOSIS — M53.3 SACRAL BACK PAIN: ICD-10-CM

## 2017-04-27 DIAGNOSIS — Z79.899 ENCOUNTER FOR LONG-TERM (CURRENT) USE OF MEDICATIONS: ICD-10-CM

## 2017-04-27 DIAGNOSIS — M53.3 COCCYGODYNIA: ICD-10-CM

## 2017-04-27 DIAGNOSIS — G43.709 CHRONIC MIGRAINE WITHOUT AURA WITHOUT STATUS MIGRAINOSUS, NOT INTRACTABLE: ICD-10-CM

## 2017-04-27 DIAGNOSIS — M53.3 SACROILIAC JOINT DYSFUNCTION OF RIGHT SIDE: Primary | ICD-10-CM

## 2017-04-27 RX ORDER — OXYCODONE AND ACETAMINOPHEN 10; 325 MG/1; MG/1
1 TABLET ORAL
Qty: 90 TAB | Refills: 0 | Status: SHIPPED | OUTPATIENT
Start: 2017-06-07 | End: 2017-07-07

## 2017-04-27 RX ORDER — INSULIN GLARGINE 100 [IU]/ML
INJECTION, SOLUTION SUBCUTANEOUS
COMMUNITY

## 2017-04-27 RX ORDER — OXYCODONE AND ACETAMINOPHEN 10; 325 MG/1; MG/1
1 TABLET ORAL
Qty: 90 TAB | Refills: 0 | Status: SHIPPED | OUTPATIENT
Start: 2017-05-09 | End: 2017-04-27 | Stop reason: SDUPTHER

## 2017-04-27 NOTE — PATIENT INSTRUCTIONS
Plan:  Retry Nucynta  mg twice daily  Percocet will be retained as needed for breakthrough pain  MRI of the sacrum to assess for possible sacroiliac misalignment/dysfunction  Follow up in 2 months or sooner if needed  Regular exercise and attention to emotional health and diet remain the most effective ways to treat chronic pain of all kinds  You may contact me with questions or concerns through [employer]

## 2017-04-27 NOTE — PROGRESS NOTES
HISTORY OF PRESENT ILLNESS  Colt Tong is a 41 y.o. female. Patient presents for follow up of chronic pain due to coccydynia, sacroiliitis, trochanteric bursitis, and lumbar postlaminectomy syndrome. She presents today to discus recent medication issues. She continues to endorse widespread pain, predominating in the coccyx and sacrum. She reports that pain began in earnest after a hard fall in 2012 on concrete that resulted in loss of consciousness. She did not seek medical attention at that time, but reports that pain continued to escalate afterwards for about two weeks and has not improved since. She has undergone multiple imaging studies and interventions such as PT and cortisone injections wsince that time, and nothing has improved this pain in a meaningful way. We discussed the potential for chronic sacroiliac dysfunction as a result of her hard fall on her buttocks. She has not had this area imaged to her knowledge. We will order an MRI of the sacrum to further evaluate. Insurance would not cover Nucynta ER until she tried Opana ER 10 mg BID. This has been of little to no benefit. Colt Tong is tolerating medications well, with no untoward side effects noted. The patient reports an average of 60% relief with percocet, but the relief is brief. She has tried and failed oxymorphone ER and morphine ER (dizziness, cognitive dysfunction). She cannot take fentanyl due to history of adhesive allergy. Most recent UDS and  were consistent with prescribed medications. Pill counts are appropriate. She is informed of side effects, risks, and benefits of this regimen, and emphasizes that she derives a significant improvement in functionality and quality of life, and notes that non-opioid medications and therapies in the past have not offered significant benefit. We will again try to have Nucynta  mg BID trialed. She denies new or worsening insomnia or constipation issues.  She denies any falls, injuries, or hospitalizations since the last visit. A total of 40 minutes was spent with the patient of which more than 50% of the time was spent counseling the patient. HPI--see above    ROS  Constitutional: Positive for malaise/fatigue. Gastrointestinal: Positive for constipation. Musculoskeletal: Positive for back pain. Neurological: Positive for weakness (generalized). Psychiatric/Behavioral: The patient has insomnia. All other systems reviewed and are negative. Physical Exam  Brisk sacroiliac tenderness noted on right side  Positive flexion test (seated and standing) on right side, with SI immobility noted  Trochanteric bursae tender to palpation bilaterally     ASSESSMENT and PLAN    ICD-10-CM ICD-9-CM    1. Sacroiliac joint dysfunction of right side M53.3 724.6 MRI PELV WO CONT   2. Sacral back pain M53.3 724.6 MRI PELV WO CONT   3. Coccydynia M53.3 724.79 MRI PELV WO CONT   4. Lumbosacral radiculopathy M54.17 724.4    5. Coccygodynia M53.3 724.79    6. Chronic migraine without aura without status migrainosus, not intractable G43.709 346.70    7. Encounter for long-term (current) use of medications Z79.899 V58.69    8. Lumbar post-laminectomy syndrome M96.1 722.83    9. Chronic pain syndrome G89.4 338.4    10. Sacroiliitis (HCC) M46.1 720.2    11.  Enthesopathy of right hip region M76.891 726.5       Plan:  Retry Nucynta  mg twice daily  Percocet will be retained as needed for breakthrough pain  MRI of the sacrum to assess for possible sacroiliac misalignment/dysfunction  Follow up in 2 months or sooner if needed  Regular exercise and attention to emotional health and diet remain the most effective ways to treat chronic pain of all kinds  You may contact me with questions or concerns through [employer]

## 2017-04-27 NOTE — MR AVS SNAPSHOT
Visit Information Date & Time Provider Department Dept. Phone Encounter #  
 4/27/2017  9:00 AM Deshaun Rivers [facility] [phone] Follow-up Instructions Return in about 3 months (around 7/27/2017). Upcoming Health Maintenance Date Due DTaP/Tdap/Td series (1 - Tdap) 1/30/1993 PAP AKA CERVICAL CYTOLOGY 1/30/1993 INFLUENZA AGE 9 TO ADULT 8/1/2016 Allergies as of 4/27/2017  Review Complete On: 3/31/2017 By: Milagro Ma LPN Severity Noted Reaction Type Reactions Iodine High 02/15/2017    Anaphylaxis Shellfish Derived High 02/21/2017    Anaphylaxis Codeine  02/15/2017    Hives Haldol [Haloperidol Lactate]  02/15/2017    Other (comments)  
 violent Imitrex [Sumatriptan]  02/15/2017    Swelling Penicillins  02/15/2017    Hives Ultram [Tramadol]  02/15/2017    Hives Current Immunizations  Never Reviewed No immunizations on file. Not reviewed this visit You Were Diagnosed With   
  
 Codes Comments Sacroiliac joint dysfunction of right side    -  Primary ICD-10-CM: M53.3 ICD-9-CM: 724.6 Sacral back pain     ICD-10-CM: M53.3 ICD-9-CM: 724.6 Coccydynia     ICD-10-CM: M53.3 ICD-9-CM: 724.79 Lumbosacral radiculopathy     ICD-10-CM: M54.17 ICD-9-CM: 724.4 Coccygodynia     ICD-10-CM: M53.3 ICD-9-CM: 724.79 Chronic migraine without aura without status migrainosus, not intractable     ICD-10-CM: Y30.046 ICD-9-CM: 346.70 Encounter for long-term (current) use of medications     ICD-10-CM: Z79.899 ICD-9-CM: V58.69 Lumbar post-laminectomy syndrome     ICD-10-CM: M96.1 ICD-9-CM: 722.83 Chronic pain syndrome     ICD-10-CM: G89.4 ICD-9-CM: 338.4 Sacroiliitis ([address]     ICD-10-CM: M46.1 ICD-9-CM: 720.2 Enthesopathy of right hip region     ICD-10-CM: M76.891 ICD-9-CM: 726.5 Vitals BP Pulse Resp OB Status Smoking Status 80/66 64 16 Hysterectomy Former Smoker Vitals History Preferred Pharmacy Pharmacy Name Phone [facility], [address] [phone] Your Updated Medication List  
  
   
This list is accurate as of: 4/27/17 10:16 AM.  Always use your most recent med list.  
  
  
  
  
 aspirin 325 mg tablet Generic drug:  aspirin Take 325 mg by mouth daily. clonazePAM 2 mg tablet Commonly known as:  Aleck Creamer Take  by mouth two (2) times a day. cloNIDine HCl 0.1 mg tablet Commonly known as:  CATAPRES Take  by mouth two (2) times a day. estradiol 0.5 mg tablet Commonly known as:  ESTRACE Take  by mouth daily. lamoTRIgine 200 mg tablet Commonly known as: LaMICtal  
Take  by mouth daily. LANTUS 100 unit/mL injection Generic drug:  insulin glargine  
by SubCUTAneous route nightly. lisinopril 20 mg tablet Commonly known as:  Pastor Headings Take  by mouth daily. metFORMIN 500 mg tablet Commonly known as:  GLUCOPHAGE Take 500 mg by mouth two (2) times daily (with meals). oxybutynin 5 mg tablet Commonly known as:  XMMUAUJC Take 5 mg by mouth three (3) times daily. oxyCODONE-acetaminophen  mg per tablet Commonly known as:  PERCOCET Take 1 Tab by mouth three (3) times daily as needed for Pain for up to 30 days. Max Daily Amount: 3 Tabs. Indications: Pain Start taking on:  6/7/2017 PARoxetine 30 mg tablet Commonly known as:  PAXIL Take 30 mg by mouth daily. rOPINIRole 0.5 mg tablet Commonly known as:  Julien Serge Take  by mouth three (3) times daily. tapentadol  mg tablet Commonly known as:  Judye Hams ER Take 1 Tab by mouth two (2) times a day. Max Daily Amount: 200 mg. Start taking on:  5/26/2017 TROKENDI  mg capsule Generic drug:  topiramate ER Take  by mouth daily. XANAX 1 mg tablet Generic drug:  ALPRAZolam  
Take  by mouth. Prescriptions Printed Refills  
 oxyCODONE-acetaminophen (PERCOCET)  mg per tablet 0 Starting on: 6/7/2017 Sig: Take 1 Tab by mouth three (3) times daily as needed for Pain for up to 30 days. Max Daily Amount: 3 Tabs. Indications: Pain Class: Print Route: Oral  
 tapentadol ER (NUCYNTA ER) 100 mg tablet 0 Starting on: 5/26/2017 Sig: Take 1 Tab by mouth two (2) times a day. Max Daily Amount: 200 mg. Class: Print Route: Oral  
  
Follow-up Instructions Return in about 3 months (around 7/27/2017). To-Do List   
 04/27/2017 Imaging:  MRI PELV WO CONT Patient Instructions Plan: 
Retry Nucynta  mg twice daily Percocet will be retained as needed for breakthrough pain MRI of the sacrum to assess for possible sacroiliac misalignment/dysfunction Follow up in 2 months or sooner if needed Regular exercise and attention to emotional health and diet remain the most effective ways to treat chronic pain of all kinds You may contact me with questions or concerns through [address] [facility]! Sergio Pettit introduces TrustRadius patient portal. Now you can access parts of your medical record, email your doctor's office, and request medication refills online. 1. In your internet browser, go to [email or web address] 2. Click on the First Time User? Click Here link in the Sign In box. You will see the New Member Sign Up page. 3. Enter your TrustRadius Access Code exactly as it appears below. You will not need to use this code after youve completed the sign-up process. If you do not sign up before the expiration date, you must request a new code. · TrustRadius Access Code: [email or web address] Expires: 5/4/2017 10:13 AM 
 
4. Enter the last four digits of your Social Security Number (xxxx) and Date of Birth (mm/dd/yyyy) as indicated and click Submit.  You will be taken to the next sign-up page. 5. Create a [employer] ID. This will be your [employer] login ID and cannot be changed, so think of one that is secure and easy to remember. 6. Create a [employer] password. You can change your password at any time. 7. Enter your Password Reset Question and Answer. This can be used at a later time if you forget your password. 8. Enter your e-mail address. You will receive e-mail notification when new information is available in [address] 9. Click Sign Up. You can now view and download portions of your medical record. 10. Click the Download Summary menu link to download a portable copy of your medical information. If you have questions, please visit the Frequently Asked Questions section of the [employer] website. Remember, [employer] is NOT to be used for urgent needs. For medical emergencies, dial 911. Now available from your iPhone and Android! Please provide this summary of care documentation to your next provider. Your primary care clinician is listed as TAYE GASCA. If you have any questions after today's visit, please call [phone].

## 2017-04-27 NOTE — PROGRESS NOTES
Nursing Notes    Patient presents to the office today in follow-up. Reviewed medications with counts as follows:    Rx Date filled Qty Dispensed Pill Count Last Dose Short   Oxymorphone er 10 mg 4/4/17 60 16 This am no   Percocet 10/325 4/10/17 90 41 This am no   Ms. Lewis Persons has a reminder for a \"due or due soon\" health maintenance. I have asked that she contact her primary care provider for follow-up on this health maintenance. POC UDS was not performed in office today    Any new labs or imaging since last appointment? NO    Have you been to an emergency room (ER) or urgent care clinic since your last visit? NO            Have you been hospitalized since your last visit? NO     If yes, where, when, and reason for visit? Have you seen or consulted any other health care providers outside of the [address] [place] [address]  since your last visit? YES     If yes, where, when, and reason for visit?    Psychiatrist

## 2017-05-08 ENCOUNTER — TELEPHONE (OUTPATIENT)
Dept: PAIN MANAGEMENT | Age: 47
End: 2017-05-08

## 2017-05-08 NOTE — TELEPHONE ENCOUNTER
The pt was called and given the results of her most recent pelvic MRI. The provider reviewed the imaging results and states that the study came back completely normal. The pt verbalized understanding and has no questions at this time. She will discuss these results with the provider at her next appt.

## 2017-05-09 DIAGNOSIS — M53.3 COCCYDYNIA: ICD-10-CM

## 2017-05-09 DIAGNOSIS — M53.3 SACROILIAC JOINT DYSFUNCTION OF RIGHT SIDE: ICD-10-CM

## 2017-05-09 DIAGNOSIS — M53.3 SACRAL BACK PAIN: ICD-10-CM

## 2017-05-22 ENCOUNTER — TELEPHONE (OUTPATIENT)
Dept: PAIN MANAGEMENT | Age: 47
End: 2017-05-22

## 2017-05-22 NOTE — TELEPHONE ENCOUNTER
Pt called to relay her Nucynta ER is causing h/a and it is making her sick to her stomach. She has been using phenergan but it is not very helpful. She is asking for advice. Will route to provider.

## 2017-05-24 NOTE — TELEPHONE ENCOUNTER
Patient updated regarding provider response; patient is requesting to increase percocet due to not being to tolerate nucynta; patient states she will not be filling next prescription for nucynta as symptoms have persisted for almost a month; please advise.

## 2017-05-24 NOTE — TELEPHONE ENCOUNTER
This side effect tends to resolve with time (1-2 weeks on average). However, if side effects are severe, she may discontinue use of Nucynta ER and continue with percocet as prescribed for pain. Follow up scheduled already I believe.  Thanks!/kg

## 2017-05-25 NOTE — TELEPHONE ENCOUNTER
Per KG:\"  This side effect tends to resolve with time (1-2 weeks on average). However, if side effects are severe, she may discontinue use of Nucynta ER and continue with percocet as prescribed for pain. Follow up scheduled already I believe. Thanks! \"    Have called pt to advise percocet had to be taken as prescribed. She would like to go back on the Opana ER.

## 2017-05-26 NOTE — TELEPHONE ENCOUNTER
Patient left a voicemail requesting a long acting medication. Called and spoke with patient. She states that she spoke with a staff member yesterday. Is requesting the status of her opana prescription.  Will route to the provider

## 2017-05-30 NOTE — TELEPHONE ENCOUNTER
Per Cece Maravilla will permit her to restart Opana ER 10  Mg twice daily. However, because this medication was not effective at her last visit, we will not be able to continue it after her next visit if it is still not helpful.  Thanks\"

## 2017-05-30 NOTE — TELEPHONE ENCOUNTER
Called and spoke with patient. Advised her of the orders from [place]. States she stopped nucynta Er and would like to start opana. States she will be taking her remaining nucynta to her local pharmacy for destruction. Advised her of the hours of operation.

## 2017-06-15 ENCOUNTER — TELEPHONE (OUTPATIENT)
Dept: PAIN MANAGEMENT | Age: 47
End: 2017-06-15

## 2017-06-15 NOTE — TELEPHONE ENCOUNTER
The pt called the office to report that she had been in the hospital from 06/11/17-06/13/17. She was given shots of dilaudid and the percocet that she was already on. The pt states that the hospital did not have the opana that she is on but gave her oxycontin in place of it. No take home prescriptions provided per pt. Pt has a follow up scheduled for 06/22/17 and she is aware of this.

## 2017-06-22 ENCOUNTER — OFFICE VISIT (OUTPATIENT)
Dept: PAIN MANAGEMENT | Age: 47
End: 2017-06-22

## 2017-06-22 VITALS
WEIGHT: 204 LBS | HEIGHT: 66 IN | DIASTOLIC BLOOD PRESSURE: 81 MMHG | SYSTOLIC BLOOD PRESSURE: 117 MMHG | BODY MASS INDEX: 32.78 KG/M2 | HEART RATE: 96 BPM

## 2017-06-22 DIAGNOSIS — G89.4 CHRONIC PAIN SYNDROME: ICD-10-CM

## 2017-06-22 DIAGNOSIS — M96.1 LUMBAR POST-LAMINECTOMY SYNDROME: ICD-10-CM

## 2017-06-22 DIAGNOSIS — M94.0 COSTOCHONDRITIS, ACUTE: Primary | ICD-10-CM

## 2017-06-22 DIAGNOSIS — Z79.899 ENCOUNTER FOR LONG-TERM (CURRENT) USE OF MEDICATIONS: ICD-10-CM

## 2017-06-22 DIAGNOSIS — M54.17 LUMBOSACRAL RADICULOPATHY: ICD-10-CM

## 2017-06-22 DIAGNOSIS — M76.891 ENTHESOPATHY OF HIP REGION ON BOTH SIDES: ICD-10-CM

## 2017-06-22 DIAGNOSIS — M76.892 ENTHESOPATHY OF HIP REGION ON BOTH SIDES: ICD-10-CM

## 2017-06-22 DIAGNOSIS — M53.3 COCCYGODYNIA: ICD-10-CM

## 2017-06-22 DIAGNOSIS — G43.709 CHRONIC MIGRAINE WITHOUT AURA WITHOUT STATUS MIGRAINOSUS, NOT INTRACTABLE: ICD-10-CM

## 2017-06-22 DIAGNOSIS — M46.1 SACROILIITIS (HCC): ICD-10-CM

## 2017-06-22 RX ORDER — LEVORPHANOL TARTRATE 2 MG/1
4 TABLET ORAL
Qty: 180 TAB | Refills: 0 | Status: SHIPPED | OUTPATIENT
Start: 2017-06-22 | End: 2017-06-22 | Stop reason: SDUPTHER

## 2017-06-22 RX ORDER — PREDNISONE 10 MG/1
30 TABLET ORAL DAILY
Qty: 15 TAB | Refills: 1 | Status: SHIPPED | OUTPATIENT
Start: 2017-06-22 | End: 2017-09-26

## 2017-06-22 RX ORDER — LEVORPHANOL TARTRATE 2 MG/1
4 TABLET ORAL
Qty: 180 TAB | Refills: 0 | Status: SHIPPED | OUTPATIENT
Start: 2017-07-21 | End: 2017-07-18

## 2017-06-22 NOTE — MR AVS SNAPSHOT
Visit Information Date & Time Provider Department Dept. Phone Encounter #  
 6/22/2017  9:00 AM Wilbur Correa [address] [place] [address] for Pain Management [phone] [id number] Follow-up Instructions Return in about 2 months (around 8/22/2017). Upcoming Health Maintenance Date Due DTaP/Tdap/Td series (1 - Tdap) 1/30/1993 PAP AKA CERVICAL CYTOLOGY 1/30/1993 INFLUENZA AGE 9 TO ADULT 8/1/2017 Allergies as of 6/22/2017  Review Complete On: 6/22/2017 By: Radha Harper Severity Noted Reaction Type Reactions Iodine High 02/15/2017    Anaphylaxis Shellfish Derived High 02/21/2017    Anaphylaxis Codeine  02/15/2017    Hives Haldol [Haloperidol Lactate]  02/15/2017    Other (comments)  
 violent Imitrex [Sumatriptan]  02/15/2017    Swelling Penicillins  02/15/2017    Hives Ultram [Tramadol]  02/15/2017    Hives Current Immunizations  Never Reviewed No immunizations on file. Not reviewed this visit You Were Diagnosed With   
  
 Codes Comments Costochondritis, acute    -  Primary ICD-10-CM: M94.0 ICD-9-CM: 733.6 Chronic migraine without aura without status migrainosus, not intractable     ICD-10-CM: X64.306 ICD-9-CM: 346.70 Lumbosacral radiculopathy     ICD-10-CM: M54.17 ICD-9-CM: 724.4 Coccygodynia     ICD-10-CM: M53.3 ICD-9-CM: 724.79 Encounter for long-term (current) use of medications     ICD-10-CM: Z79.899 ICD-9-CM: V58.69 Enthesopathy of hip region on both sides     ICD-10-CM: M76.891, N24.197 ICD-9-CM: 726.5 Lumbar post-laminectomy syndrome     ICD-10-CM: M96.1 ICD-9-CM: 722.83 Chronic pain syndrome     ICD-10-CM: G89.4 ICD-9-CM: 338.4 Sacroiliitis ([facility] [address]     ICD-10-CM: M46.1 ICD-9-CM: 720.2 Vitals BP Pulse Height(growth percentile) Weight(growth percentile) BMI OB Status 117/81 96 5' 6\" (1.676 m) 204 lb (92.5 kg) 32.93 kg/m2 Hysterectomy Smoking Status Former Smoker BMI and BSA Data Body Mass Index Body Surface Area  
 32.93 kg/m 2 2.08 m 2 Preferred Pharmacy Pharmacy Name Phone [facility], [address] [place] [address] [phone] Your Updated Medication List  
  
   
This list is accurate as of: 6/22/17 10:05 AM.  Always use your most recent med list.  
  
  
  
  
 aspirin 325 mg tablet Generic drug:  aspirin Take 325 mg by mouth daily. clonazePAM 2 mg tablet Commonly known as:  Winferd Janelle Take  by mouth two (2) times a day. cloNIDine HCl 0.1 mg tablet Commonly known as:  CATAPRES Take  by mouth two (2) times a day. estradiol 0.5 mg tablet Commonly known as:  ESTRACE Take  by mouth daily. gabapentin 400 mg capsule Commonly known as:  NEURONTIN  
TAKE ONE CAPSULE BY MOUTH THREE TIMES DAILY FOR 30 DAYS  
  
 lamoTRIgine 200 mg tablet Commonly known as: LaMICtal  
Take  by mouth daily. LANTUS 100 unit/mL injection Generic drug:  insulin glargine  
by SubCUTAneous route nightly. levorphanol 2 mg tablet Commonly known as:  LEVO-DROMORAN Take 2 Tabs by mouth every eight (8) hours as needed. Max Daily Amount: 12 mg. For chronic, severe pain Start taking on:  7/21/2017  
  
 lisinopril 20 mg tablet Commonly known as:  Anyi Jhonatan Take  by mouth daily. metFORMIN 500 mg tablet Commonly known as:  GLUCOPHAGE Take 500 mg by mouth two (2) times daily (with meals). oxybutynin 5 mg tablet Commonly known as:  CBUSOIGW Take 5 mg by mouth three (3) times daily. oxyCODONE-acetaminophen  mg per tablet Commonly known as:  PERCOCET Take 1 Tab by mouth three (3) times daily as needed for Pain for up to 30 days. Max Daily Amount: 3 Tabs. Indications: Pain PARoxetine 30 mg tablet Commonly known as:  PAXIL Take 30 mg by mouth daily. predniSONE 10 mg tablet Commonly known as:  Cele Collin Take 3 Tabs by mouth daily. Take with food for up to five days for costochondritis  
  
 rOPINIRole 0.5 mg tablet Commonly known as:  Nicholas Jose Daniel Take  by mouth three (3) times daily. tapentadol  mg tablet Commonly known as:  Miah Abed ER Take 1 Tab by mouth two (2) times a day. Max Daily Amount: 200 mg. TROKENDI  mg capsule Generic drug:  topiramate ER Take  by mouth daily. XANAX 1 mg tablet Generic drug:  ALPRAZolam  
Take  by mouth. Prescriptions Printed Refills  
 levorphanol (LEVO-DROMORAN) 2 mg tablet 0 Starting on: 7/21/2017 Sig: Take 2 Tabs by mouth every eight (8) hours as needed. Max Daily Amount: 12 mg. For chronic, severe pain  
 Class: Print Route: Oral  
  
Prescriptions Sent to Pharmacy Refills  
 predniSONE (DELTASONE) 10 mg tablet 1 Sig: Take 3 Tabs by mouth daily. Take with food for up to five days for costochondritis Class: Normal  
 Pharmacy: [address] #: [phone] Route: Oral  
  
Follow-up Instructions Return in about 2 months (around 8/22/2017). Patient Instructions Plan: We will try Levorphanol 2 mg for chronic, severe pain in lieu of Percocet/ Opana ER. 2 tablets every eight hours as needed Prednisone may be used for costochondritis--3 tablets daily for up to five Research \"back mice\" Follow up in 2 months or sooner if needed Regular exercise and attention to emotional health and diet remain the most effective ways to treat chronic pain of all kinds You may contact me with questions or concerns through [address] Introducing [facility]! Jeffry Wilcox introduces [employer] patient portal. Now you can access parts of your medical record, email your doctor's office, and request medication refills online. 1. In your internet browser, go to [email or web address] 2. Click on the First Time User? Click Here link in the Sign In box. You will see the New Member Sign Up page. 3. Enter your Zigmo Access Code exactly as it appears below. You will not need to use this code after youve completed the sign-up process. If you do not sign up before the expiration date, you must request a new code. · Zigmo Access Code: [email or web address] Expires: 9/20/2017 10:05 AM 
 
4. Enter the last four digits of your Social Security Number (xxxx) and Date of Birth (mm/dd/yyyy) as indicated and click Submit. You will be taken to the next sign-up page. 5. Create a Zigmo ID. This will be your Zigmo login ID and cannot be changed, so think of one that is secure and easy to remember. 6. Create a Zigmo password. You can change your password at any time. 7. Enter your Password Reset Question and Answer. This can be used at a later time if you forget your password. 8. Enter your e-mail address. You will receive e-mail notification when new information is available in [address] 9. Click Sign Up. You can now view and download portions of your medical record. 10. Click the Download Summary menu link to download a portable copy of your medical information. If you have questions, please visit the Frequently Asked Questions section of the Zigmo website. Remember, Zigmo is NOT to be used for urgent needs. For medical emergencies, dial 911. Now available from your iPhone and Android! Please provide this summary of care documentation to your next provider. Your primary care clinician is listed as TAYE GASCA. If you have any questions after today's visit, please call [phone].

## 2017-06-22 NOTE — PATIENT INSTRUCTIONS
Plan:  We will try Levorphanol 2 mg for chronic, severe pain in lieu of Percocet/ Opana ER.  2 tablets every eight hours as needed  Prednisone may be used for costochondritis--3 tablets daily for up to five  Research \"back mice\"  Follow up in 2 months or sooner if needed  Regular exercise and attention to emotional health and diet remain the most effective ways to treat chronic pain of all kinds  You may contact me with questions or concerns through [address]

## 2017-06-22 NOTE — PROGRESS NOTES
HISTORY OF PRESENT ILLNESS  Fabián Dorantes is a 41 y.o. female. Patient presents for follow up of chronic pain due to coccydynia, sacroiliitis, trochanteric bursitis, and lumbar postlaminectomy syndrome. She presents today to discus recent medication issues. She reports that she was hospitalized for observation and nuclear stress test 6/12-6/13 for complaints of sternal chest pain. All labs were normal, as was the stress test. She continues to complain of sternal pain that is present often during the day, and is tender to touch along the sternocostal margin. She also complains of \"muscle lumps\" that are tender and painful with touch/movement on both flanks; these have been more noticeable since she lost about 20 lbs. We discussed treatment options at length--see treatment plan below. She continues to endorse widespread pain, predominating in the coccyx and sacrum. She reports that pain began in earnest after a hard fall in 2012 on concrete that resulted in loss of consciousness. She did not seek medical attention at that time, but reports that pain continued to escalate afterwards for about two weeks and has not improved since. She has undergone multiple imaging studies and interventions such as PT and cortisone injections wsince that time, and nothing has improved this pain in a meaningful way. We discussed her recent MRI of the pelvis/sacrum, which was normal. This further supports the diagnosis of central sensitization. Pt reports average of 6-7/10 pain scale most days. Opana ER 10 mg is not effective for pain, although well-tolerated. Because the FDA may be removing Opana ER from the market in coming weeks or months, we will rotate to a different mediation. She recalls that she received OxyContin in the hospital, but this likewise was ineffective. She has also previously tried and failed hydrocodone, tramadol (allergic--hives), Percocet, morphine, and hydromorphone.  We discussed the risks and limitations of using opioids in the treatment of chronic pain, however, she cannot take NSAIDs due to CHD history as well as Nissen fundoplication for severe GERD. She has tried and failed Lyrica and Cymbalta. She is currently taking Gabapentin, which is effective for neuropathy of the feet but not her chronic myofascial pain. We will rotate to Levorphanol--dosing and side effects discussed in detail. She denies new or worsening insomnia or constipation issues. She denies any falls, injuries, or hospitalizations since the last visit. A total of 45 minutes was spent with the patient of which more than 50% of the time was spent counseling the patient. HPI--see above    ROS  Constitutional: Positive for malaise/fatigue. Gastrointestinal: Positive for constipation. Musculoskeletal: Positive for back pain. Neurological: Positive for weakness (generalized). Psychiatric/Behavioral: The patient has insomnia. All other systems reviewed and are negative. Physical Exam  Constitutional: She is oriented to person, place, and time. She appears distressed. HENT:   Head: Normocephalic and atraumatic. Right Ear: External ear normal.   Left Ear: External ear normal.   Nose: Nose normal.   Mouth/Throat: Oropharynx is clear and moist. No oropharyngeal exudate. Eyes: Conjunctivae and EOM are normal. Pupils are equal, round, and reactive to light. Right eye exhibits no discharge. Left eye exhibits no discharge. No scleral icterus. Neck: Normal range of motion. Neck supple. Cardiovascular: Normal rate, regular rhythm and normal heart sounds. Pulmonary/Chest: Effort normal and breath sounds normal. No respiratory distress. She has no wheezes. She has no rales. Abdominal: Soft. She exhibits no distension. There is no tenderness. There is no rebound and no guarding. Musculoskeletal: She exhibits tenderness. Lumbar back: She exhibits decreased range of motion, tenderness, pain and spasm.    Neurological: She is alert and oriented to person, place, and time. She has normal reflexes. No cranial nerve deficit or sensory deficit. She exhibits normal muscle tone. Gait abnormal. Coordination normal.   Reflex Scores:       Tricep reflexes are 2+ on the right side and 2+ on the left side. Bicep reflexes are 2+ on the right side and 2+ on the left side. Brachioradialis reflexes are 2+ on the right side and 2+ on the left side. Patellar reflexes are 2+ on the right side and 2+ on the left side. Achilles reflexes are 2+ on the right side and 2+ on the left side. Skin: Skin is warm. No rash noted. Psychiatric: She has a normal mood and affect. Her behavior is normal. Judgment and thought content normal.   ASSESSMENT and PLAN    ICD-10-CM ICD-9-CM    1. Costochondritis, acute M94.0 733.6    2. Chronic migraine without aura without status migrainosus, not intractable G43.709 346.70    3. Lumbosacral radiculopathy M54.17 724.4    4. Coccygodynia M53.3 724.79    5. Encounter for long-term (current) use of medications Z79.899 V58.69    6. Enthesopathy of hip region on both sides M76.891 726.5     M76.892     7. Lumbar post-laminectomy syndrome M96.1 722.83    8. Chronic pain syndrome G89.4 338.4    9. [facility] M46.1 720.2       Plan: We will try Levorphanol 2 mg for chronic, severe pain in lieu of Percocet/ Opana ER. 2 tablets every eight hours as needed  Prednisone may be used for costochondritis--3 tablets daily for up to five  Research \"back mice\"--episacroiliac lipoma.  This is likely what you are feeling in the lower back  Follow up in 2 months or sooner if needed  Regular exercise and attention to emotional health and diet remain the most effective ways to treat chronic pain of all kinds  You may contact me with questions or concerns through [address]

## 2017-06-22 NOTE — PROGRESS NOTES
Nursing Notes    Patient presents to the office today in follow-up. Patient rates her pain at 6/10 on the numerical pain scale. Reviewed medications with counts as follows:    Rx Date filled Qty Dispensed Pill Count Last Dose Short   Oxycodone APAP 10-325mg 06/07/17 90 39 today no   Oxymorphone HCL ER 10mg 05/30/17 60 16 today no                                  Comments:     POC UDS was not performed in office today    Any new labs or imaging since last appointment? YES,CXR and stress test [facility]    Have you been to an emergency room (ER) or urgent care clinic since your last visit? YES, [facility]            Have you been hospitalized since your last visit? YES, [facility]     If yes, where, when, and reason for visit? Have you seen or consulted any other health care providers outside of the [address] [place] [address]  since your last visit? YES,PCP     If yes, where, when, and reason for visit? HM deferred to pcp.

## 2017-07-11 ENCOUNTER — TELEPHONE (OUTPATIENT)
Dept: PAIN MANAGEMENT | Age: 47
End: 2017-07-11

## 2017-07-11 NOTE — TELEPHONE ENCOUNTER
Pt called relaying the new levorphanol is not effective at all. She is taking 8 daily and filled back in late June. She is wanting to try morphine again that she feels she did not give enough time to see if it would work. She was given this back in March and has been tried on several more long acting since then. Will route to provider for consult.

## 2017-07-13 NOTE — TELEPHONE ENCOUNTER
Per KG:\"Unfortunately this is not an option. She has repeatedly asked to schedule urgent visits to discuss medications that were prescribed just days earlier, usually because they were poorly tolerated or because they are ineffective. Upon chart review, she reported that morphine caused oversedation and dizziness, so this is definitely NOT an option. \"giving it more time\" will only increase her risk of a fatal overdose, if her reported symptoms are indeed accurate. She will need to stay with her current medication until her next visit. And we will also need to add her to our next MDM to discuss whether or not we have a therapeutic relationship with her.  Thanks   Dixie \"

## 2017-07-18 ENCOUNTER — OFFICE VISIT (OUTPATIENT)
Dept: PAIN MANAGEMENT | Age: 47
End: 2017-07-18

## 2017-07-18 ENCOUNTER — DOCUMENTATION ONLY (OUTPATIENT)
Dept: PAIN MANAGEMENT | Age: 47
End: 2017-07-18

## 2017-07-18 VITALS
TEMPERATURE: 97.1 F | DIASTOLIC BLOOD PRESSURE: 87 MMHG | HEIGHT: 66 IN | WEIGHT: 204 LBS | SYSTOLIC BLOOD PRESSURE: 120 MMHG | BODY MASS INDEX: 32.78 KG/M2 | RESPIRATION RATE: 20 BRPM | HEART RATE: 109 BPM

## 2017-07-18 VITALS
HEIGHT: 66 IN | WEIGHT: 204 LBS | TEMPERATURE: 97.1 F | HEART RATE: 109 BPM | DIASTOLIC BLOOD PRESSURE: 87 MMHG | RESPIRATION RATE: 20 BRPM | BODY MASS INDEX: 32.78 KG/M2 | SYSTOLIC BLOOD PRESSURE: 120 MMHG

## 2017-07-18 DIAGNOSIS — M53.3 COCCYGODYNIA: ICD-10-CM

## 2017-07-18 DIAGNOSIS — Z79.899 ENCOUNTER FOR LONG-TERM (CURRENT) USE OF MEDICATIONS: Primary | ICD-10-CM

## 2017-07-18 DIAGNOSIS — M96.1 LUMBAR POST-LAMINECTOMY SYNDROME: ICD-10-CM

## 2017-07-18 DIAGNOSIS — Z79.899 ENCOUNTER FOR LONG-TERM (CURRENT) USE OF MEDICATIONS: ICD-10-CM

## 2017-07-18 DIAGNOSIS — M47.816 SPONDYLOSIS OF LUMBAR REGION WITHOUT MYELOPATHY OR RADICULOPATHY: Primary | ICD-10-CM

## 2017-07-18 DIAGNOSIS — M76.891 ENTHESOPATHY OF HIP REGION ON BOTH SIDES: ICD-10-CM

## 2017-07-18 DIAGNOSIS — M76.892 ENTHESOPATHY OF HIP REGION ON BOTH SIDES: ICD-10-CM

## 2017-07-18 RX ORDER — OXYCODONE AND ACETAMINOPHEN 10; 325 MG/1; MG/1
1 TABLET ORAL
Qty: 60 TAB | Refills: 0 | Status: SHIPPED | OUTPATIENT
Start: 2017-07-18 | End: 2017-07-18 | Stop reason: SDUPTHER

## 2017-07-18 RX ORDER — OXYCODONE AND ACETAMINOPHEN 10; 325 MG/1; MG/1
1 TABLET ORAL
Qty: 60 TAB | Refills: 0 | Status: SHIPPED | OUTPATIENT
Start: 2017-08-16 | End: 2018-02-12

## 2017-07-18 RX ORDER — OXYCODONE HCL 10 MG/1
10 TABLET, FILM COATED, EXTENDED RELEASE ORAL EVERY 12 HOURS
Qty: 60 TAB | Refills: 0 | Status: SHIPPED | OUTPATIENT
Start: 2017-09-16 | End: 2017-09-26

## 2017-07-18 RX ORDER — OXYCODONE HCL 10 MG/1
10 TABLET, FILM COATED, EXTENDED RELEASE ORAL EVERY 12 HOURS
Qty: 60 TAB | Refills: 0 | Status: SHIPPED | OUTPATIENT
Start: 2017-07-18 | End: 2017-07-18 | Stop reason: SDUPTHER

## 2017-07-18 NOTE — PROGRESS NOTES
HISTORY OF PRESENT ILLNESS  Edilson Kirkland is a 41 y.o. female. Patient presents for follow up of chronic pain due to coccydynia, sacroiliitis, trochanteric bursitis, and lumbar postlaminectomy syndrome. She presents today to discus recent medication issues. She again reports that this most recent medication change to Levorphanol is not offering any meaningful pain relief. She has previously tried and failed a panoply of medications, including Opana ER, hydromorphone, Nucynta er (cost), OxyContin, MS contin, hydrocodone, Percocet, tramadol (allergic), Lyrica, Cymbalta, and codeine. She has not tried methadone but declines to try this, stating that her sister  from a heroin overdose, stating the word methadone \"scares me to death\". She has the same feeling about fentanyl, which was also in her sister's system when she overdosed. We discussed the risks and limitations of using opioids to treat chronic myofascial pain, and she is aware that opioids may not be an option for treating her pain if we cannot find a medication that is effective or tolerated well. She requests to try oxycontin again, as she is unsure if she did indeed try this medication as she told the provider at her last visit. Dose and side effects were discussed in detail with the patient. Six week follow up will be arranged. See treatment plan below. We also discussed non-pharmacologic interventions to address her chronic low back pain. One of the procedures discussed is RFA of the lumbar spine. She is interested in pursuing this and will research this further. This can be scheduled at her visit with Dr. Itzel Jones in six weeks if she wishes to proceed. She denies new or worsening insomnia or constipation issues. She denies any falls, injuries, or hospitalizations since the last visit. A total of 45 minutes was spent with the patient of which more than 50% of the time was spent counseling the patient.    HPI--see above    ROS  Constitutional: Positive for malaise/fatigue. Gastrointestinal: Positive for constipation. Musculoskeletal: Positive for back pain. Neurological: Positive for weakness (generalized). Psychiatric/Behavioral: The patient has insomnia. All other systems reviewed and are negative. Physical Exam  Constitutional: She is oriented to person, place, and time. She appears distressed. HENT:   Head: Normocephalic and atraumatic. Right Ear: External ear normal.   Left Ear: External ear normal.   Nose: Nose normal.   Mouth/Throat: Oropharynx is clear and moist. No oropharyngeal exudate. Eyes: Conjunctivae and EOM are normal. Pupils are equal, round, and reactive to light. Right eye exhibits no discharge. Left eye exhibits no discharge. No scleral icterus. Neck: Normal range of motion. Neck supple. Cardiovascular: Normal rate, regular rhythm and normal heart sounds. Pulmonary/Chest: Effort normal and breath sounds normal. No respiratory distress. She has no wheezes. She has no rales. Abdominal: Soft. She exhibits no distension. There is no tenderness. There is no rebound and no guarding. Musculoskeletal: She exhibits tenderness. Lumbar back: She exhibits decreased range of motion, tenderness, pain and spasm. Neurological: She is alert and oriented to person, place, and time. She has normal reflexes. No cranial nerve deficit or sensory deficit. She exhibits normal muscle tone. Gait abnormal. Coordination normal.     Psychiatric: She has a normal mood and affect. Her behavior is normal. Judgment and thought content normal.   ASSESSMENT and PLAN    ICD-10-CM ICD-9-CM    1. Spondylosis of lumbar region without myelopathy or radiculopathy M47.816 721.3    2. Coccygodynia M53.3 724.79    3. Encounter for long-term (current) use of medications Z79.899 V58.69    4. Enthesopathy of hip region on both sides M76.891 726.5     M76.892     5. Lumbar post-laminectomy syndrome M96.1 722.83       Plan:   We will try OxyContin 10 mg every twelve hours for chronic, severe pain  Percocet 10/325 may be used up to twice daily as needed for breakthrough pain  Continue Gabapentin for neuropathy pain and fibromyalgia  We will also arrange a consult with Dr. Selin Jimenez to discuss radiofrequency ablation for lower back/spine pain  Follow up in 6 weeks or sooner if needed  Regular exercise and attention to emotional health and diet remain the most effective ways to treat chronic pain of all kinds  You may contact me with questions or concerns through [address]

## 2017-07-18 NOTE — PROGRESS NOTES
Nursing Notes    Patient presents to the office today in follow-up. Patient rates her pain at 8/10 on the numerical pain scale. Reviewed medications with counts as follows:    Rx Date filled Qty Dispensed Pill Count Last Dose Short   Levorphanol 2 mg - pt's prescription was for 180 tabs but the pharmacy only dispensed 160. Pt states that she does not like these pills because they do not work 06/22/17 160 per bottles 14 This a.m. POC UDS was performed in office today    Any new labs or imaging since last appointment? NO    Have you been to an emergency room (ER) or urgent care clinic since your last visit? NO            Have you been hospitalized since your last visit? NO     If yes, where, when, and reason for visit? Have you seen or consulted any other health care providers outside of the [facility]  since your last visit? NO     If yes, where, when, and reason for visit? HM deferred to pcp.

## 2017-07-18 NOTE — MR AVS SNAPSHOT
Visit Information Date & Time Provider Department Dept. Phone Encounter #  
 7/18/2017  8:45 AM [facility] Sylwia English [address] [place] [address] for Pain Management [phone] [id number] Follow-up Instructions Return in about 7 weeks (around 9/5/2017). Your Appointments 7/25/2017  3:15 PM  
CONSULT with Chiquita Pinto MD  
[address] [place] [address] for Pain Management [address] [place] [address]) Appt Note: consult with [facility] on possible RFA  
 [address] [facility] [address]  
[phone] [address]  
  
    
 9/8/2017  3:00 PM  
Follow Up with Eugenia Thapa MD  
[address] [place] [address] for Pain Management [address] [place] [address]) Appt Note: bryson f/u w/provider previous a [facility] patient [address] [facility] [address] [phone] [id number] [id number] Upcoming Health Maintenance Date Due DTaP/Tdap/Td series (1 - Tdap) 1/30/1993 PAP AKA CERVICAL CYTOLOGY 1/30/1993 INFLUENZA AGE 9 TO ADULT 8/1/2017 Allergies as of 7/18/2017  Review Complete On: 7/18/2017 By: Whit Eaton LPN Severity Noted Reaction Type Reactions Iodine High 02/15/2017    Anaphylaxis Shellfish Derived High 02/21/2017    Anaphylaxis Codeine  02/15/2017    Hives Haldol [Haloperidol Lactate]  02/15/2017    Other (comments)  
 violent Imitrex [Sumatriptan]  02/15/2017    Swelling Penicillins  02/15/2017    Hives Ultram [Tramadol]  02/15/2017    Hives Current Immunizations  Never Reviewed No immunizations on file. Not reviewed this visit You Were Diagnosed With   
  
 Codes Comments Spondylosis of lumbar region without myelopathy or radiculopathy    -  Primary ICD-10-CM: M47.816 ICD-9-CM: 721.3 Coccygodynia     ICD-10-CM: M53.3 ICD-9-CM: 724.79 Encounter for long-term (current) use of medications     ICD-10-CM: Z79.899 ICD-9-CM: V58.69 Enthesopathy of hip region on both sides     ICD-10-CM: M76.891, J62.909 ICD-9-CM: 726.5 Lumbar post-laminectomy syndrome     ICD-10-CM: M96.1 ICD-9-CM: 722.83 Vitals BP Pulse Temp Resp Height(growth percentile) Weight(growth percentile) 120/87 (!) 109 97.1 °F (36.2 °C) 20 5' 6\" (1.676 m) 204 lb (92.5 kg) BMI OB Status Smoking Status 32.93 kg/m2 Hysterectomy Former Smoker Vitals History BMI and BSA Data Body Mass Index Body Surface Area  
 32.93 kg/m 2 2.08 m 2 Preferred Pharmacy Pharmacy Name Phone [facility], [address] [place] [address] [phone] Your Updated Medication List  
  
   
This list is accurate as of: 7/18/17  9:53 AM.  Always use your most recent med list.  
  
  
  
  
 aspirin 325 mg tablet Generic drug:  aspirin Take 325 mg by mouth daily. clonazePAM 2 mg tablet Commonly known as:  Marolyn Norberto Take  by mouth two (2) times a day. cloNIDine HCl 0.1 mg tablet Commonly known as:  CATAPRES Take  by mouth two (2) times a day. estradiol 0.5 mg tablet Commonly known as:  ESTRACE Take  by mouth daily. gabapentin 400 mg capsule Commonly known as:  NEURONTIN  
TAKE ONE CAPSULE BY MOUTH THREE TIMES DAILY FOR 30 DAYS  
  
 lamoTRIgine 200 mg tablet Commonly known as: LaMICtal  
Take  by mouth daily. LANTUS 100 unit/mL injection Generic drug:  insulin glargine  
by SubCUTAneous route nightly. lisinopril 20 mg tablet Commonly known as:  Zelalem Royer Take  by mouth daily. metFORMIN 500 mg tablet Commonly known as:  GLUCOPHAGE Take 500 mg by mouth two (2) times daily (with meals). oxybutynin 5 mg tablet Commonly known as:  RWXDUUPU Take 5 mg by mouth three (3) times daily. oxyCODONE ER 10 mg ER tablet Commonly known as:  OxyCONTIN Take 1 Tab by mouth every twelve (12) hours. Max Daily Amount: 20 mg.  For chronic, severe pain Start taking on:  9/16/2017  
  
 oxyCODONE-acetaminophen  mg per tablet Commonly known as:  PERCOCET Take 1 Tab by mouth two (2) times daily as needed for Pain. Max Daily Amount: 2 Tabs. For breakthrough pain Start taking on:  8/16/2017 PARoxetine 30 mg tablet Commonly known as:  PAXIL Take 30 mg by mouth daily. predniSONE 10 mg tablet Commonly known as:  Lennart Spearing Take 3 Tabs by mouth daily. Take with food for up to five days for costochondritis  
  
 rOPINIRole 0.5 mg tablet Commonly known as:  Rocio Jester Take  by mouth three (3) times daily. tapentadol  mg tablet Commonly known as:  Hobert Joseph ER Take 1 Tab by mouth two (2) times a day. Max Daily Amount: 200 mg. TROKENDI  mg capsule Generic drug:  topiramate ER Take  by mouth daily. XANAX 1 mg tablet Generic drug:  ALPRAZolam  
Take  by mouth. Prescriptions Printed Refills  
 oxyCODONE ER (OXYCONTIN) 10 mg ER tablet 0 Starting on: 9/16/2017 Sig: Take 1 Tab by mouth every twelve (12) hours. Max Daily Amount: 20 mg. For chronic, severe pain  
 Class: Print Route: Oral  
 oxyCODONE-acetaminophen (PERCOCET)  mg per tablet 0 Starting on: 8/16/2017 Sig: Take 1 Tab by mouth two (2) times daily as needed for Pain. Max Daily Amount: 2 Tabs. For breakthrough pain  
 Class: Print Route: Oral  
  
Follow-up Instructions Return in about 7 weeks (around 9/5/2017). Patient Instructions Plan: We will try OxyContin 10 mg every twelve hours for chronic, severe pain Percocet 10/325 may be used up to twice daily as needed for breakthrough pain 
Continue Gabapentin for neuropathy pain and fibromyalgia We will also arrange a consult with Dr. Donaldo Barton to discuss radiofrequency ablation for lower back/spine pain Follow up in 6 weeks or sooner if needed Regular exercise and attention to emotional health and diet remain the most effective ways to treat chronic pain of all kinds You may contact me with questions or concerns through [address] Learning About Medial Branch Block and Neurotomy What are medial branch block and neurotomy? Facet joints connect your vertebrae to each other. Problems in these joints can cause chronic (long-term) pain in the neck or back. They can sometimes affect the shoulders, arms, buttocks, or legs. Medial branch nerves are the nerves that carry many of the pain messages from your facet joints. Radiofrequency medial branch neurotomy is a type of medial branch neurotomy that is used to relieve arthritis pain. It uses radio waves to damage nerves in your neck or back so that they can no longer send pain messages to your brain. Before your doctor knows if a neurotomy will help you, he or she will do a medial branch block to find out if certain nerves are the ones that are a source of your pain. You will need two separate visits to the outpatient center or hospital to have both procedures. How is a medial branch block done? The doctor will use a tiny needle to numb the skin where you will get the block. Then he or she puts the block needle into the numbed area. You may feel some pressure, but you should not feel pain. Using fluoroscopy (live X-ray) to guide the needle, the doctor injects medicine onto one or more nerves to make them numb. If you get relief from your pain in the next 4 to 6 hours, it's a sign that those nerves may be contributing to your pain. The relief will last only a short time. You may then have a medial branch neurotomy at a later visit to try to get longer relief. It takes 20 to 30 minutes to get the block. You can go home after the doctor watches you for about an hour. You will get instructions on how to report how much pain you have when you are at home. You will need someone to drive you home. How is medial branch neurotomy done? The doctor will use a tiny needle to numb the skin where you will get the neurotomy. Then he or she puts the neurotomy needle into the numbed area. You may feel some pressure. Using fluoroscopy (live X-ray) to guide the needle, the doctor sends radio waves through the needle to the nerve for 60 to 90 seconds. The radio waves heat the nerve, which damages it. The doctor may do this several times. And he or she may treat more than one nerve. It takes 45 to 90 minutes to get a neurotomy, depending on how many nerves are heated. You will probably go home 30 to 60 minutes later. You will need someone to drive you home. What can you expect after a neurotomy? You may feel a little sore or tender at the injection site at first. But after a successful neurotomy, most people have pain relief right away. It often lasts for 9 to 12 months or longer. Sometimes the pain relief is permanent. If your pain does come back, it may mean that the damaged nerve has healed and can send pain messages again. Or it can mean that a different nerve is causing pain. Your doctor will discuss your options with you. Follow-up care is a key part of your treatment and safety. Be sure to make and go to all appointments, and call your doctor if you are having problems. It's also a good idea to know your test results and keep a list of the medicines you take. Where can you learn more? Go to http://kayley-cory.info/. Enter K328 in the search box to learn more about \"Learning About Medial Branch Block and Neurotomy. \" Current as of: October 14, 2016 Content Version: 11.3 © 1698-8726 [employer]. Care instructions adapted under license by [employer] (which disclaims liability or warranty for this information).  If you have questions about a medical condition or this instruction, always ask your healthcare professional. [address] any warranty or liability for your use of this information. Introducing [facility]! Dear Melissa Davidson: Thank you for requesting a [employer] account. Our records indicate that you already have an active [employer] account. You can access your account anytime at [email or web address] Did you know that you can access your hospital and ER discharge instructions at any time in [employer]? You can also review all of your test results from your hospital stay or ER visit. Additional Information If you have questions, please visit the Frequently Asked Questions section of the [employer] website at [email or web address] Remember, [employer] is NOT to be used for urgent needs. For medical emergencies, dial 911. Now available from your iPhone and Android! Please provide this summary of care documentation to your next provider. Your primary care clinician is listed as TAYE GASCA. If you have any questions after today's visit, please call [phone].

## 2017-07-18 NOTE — PROGRESS NOTES
Nursing Notes     Patient presents to the office today in follow-up. Patient rates her pain at 8/10 on the numerical pain scale.      Reviewed medications with counts as follows:    Rx Date filled Qty Dispensed Pill Count Last Dose Short   Levorphanol 2 mg - pt's prescription was for 180 tabs but the pharmacy only dispensed 160. Pt states that she does not like these pills because they do not work 06/22/17 160 per bottles 14 This a.m.                                                   POC UDS was performed in office today     Any new labs or imaging since last appointment? NO     Have you been to an emergency room (ER) or urgent care clinic since your last visit? NO             Have you been hospitalized since your last visit? NO     If yes, where, when, and reason for visit?      Have you seen or consulted any other health care providers outside of the [address] [place] [address]  since your last visit?   NO     If yes, where, when, and reason for visit?      HM deferred to pcp.

## 2017-07-18 NOTE — PATIENT INSTRUCTIONS
Plan:  We will try OxyContin 10 mg every twelve hours for chronic, severe pain  Percocet 10/325 may be used up to twice daily as needed for breakthrough pain  Continue Gabapentin for neuropathy pain and fibromyalgia  We will also arrange a consult with Dr. Ken Nix to discuss radiofrequency ablation for lower back/spine pain  Follow up in 6 weeks or sooner if needed  Regular exercise and attention to emotional health and diet remain the most effective ways to treat chronic pain of all kinds  You may contact me with questions or concerns through [address] and Neurotomy  What are medial branch block and neurotomy? Facet joints connect your vertebrae to each other. Problems in these joints can cause chronic (long-term) pain in the neck or back. They can sometimes affect the shoulders, arms, buttocks, or legs. Medial branch nerves are the nerves that carry many of the pain messages from your facet joints. Radiofrequency medial branch neurotomy is a type of medial branch neurotomy that is used to relieve arthritis pain. It uses radio waves to damage nerves in your neck or back so that they can no longer send pain messages to your brain. Before your doctor knows if a neurotomy will help you, he or she will do a medial branch block to find out if certain nerves are the ones that are a source of your pain. You will need two separate visits to the outpatient center or hospital to have both procedures. How is a medial branch block done? The doctor will use a tiny needle to numb the skin where you will get the block. Then he or she puts the block needle into the numbed area. You may feel some pressure, but you should not feel pain. Using fluoroscopy (live X-ray) to guide the needle, the doctor injects medicine onto one or more nerves to make them numb. If you get relief from your pain in the next 4 to 6 hours, it's a sign that those nerves may be contributing to your pain.  The relief will last only a short time. You may then have a medial branch neurotomy at a later visit to try to get longer relief. It takes 20 to 30 minutes to get the block. You can go home after the doctor watches you for about an hour. You will get instructions on how to report how much pain you have when you are at home. You will need someone to drive you home. How is medial branch neurotomy done? The doctor will use a tiny needle to numb the skin where you will get the neurotomy. Then he or she puts the neurotomy needle into the numbed area. You may feel some pressure. Using fluoroscopy (live X-ray) to guide the needle, the doctor sends radio waves through the needle to the nerve for 60 to 90 seconds. The radio waves heat the nerve, which damages it. The doctor may do this several times. And he or she may treat more than one nerve. It takes 45 to 90 minutes to get a neurotomy, depending on how many nerves are heated. You will probably go home 30 to 60 minutes later. You will need someone to drive you home. What can you expect after a neurotomy? You may feel a little sore or tender at the injection site at first. But after a successful neurotomy, most people have pain relief right away. It often lasts for 9 to 12 months or longer. Sometimes the pain relief is permanent. If your pain does come back, it may mean that the damaged nerve has healed and can send pain messages again. Or it can mean that a different nerve is causing pain. Your doctor will discuss your options with you. Follow-up care is a key part of your treatment and safety. Be sure to make and go to all appointments, and call your doctor if you are having problems. It's also a good idea to know your test results and keep a list of the medicines you take. Where can you learn more? Go to http://kayley-cory.info/. Enter X217 in the search box to learn more about \"Learning About Medial Branch Block and Neurotomy. \"  Current as of: October 14, 2016  Content Version: 11.3  © 9725-6302 [employer]. Care instructions adapted under license by "[employer]" (which disclaims liability or warranty for this information). If you have questions about a medical condition or this instruction, always ask your healthcare professional. [place] [address] any warranty or liability for your use of this information.

## 2017-07-20 ENCOUNTER — TELEPHONE (OUTPATIENT)
Dept: PAIN MANAGEMENT | Age: 47
End: 2017-07-20

## 2017-07-21 ENCOUNTER — TELEPHONE (OUTPATIENT)
Dept: PAIN MANAGEMENT | Age: 47
End: 2017-07-21

## 2017-07-25 ENCOUNTER — OFFICE VISIT (OUTPATIENT)
Dept: PAIN MANAGEMENT | Age: 47
End: 2017-07-25

## 2017-07-25 VITALS
HEIGHT: 66 IN | TEMPERATURE: 98.8 F | BODY MASS INDEX: 32.78 KG/M2 | DIASTOLIC BLOOD PRESSURE: 85 MMHG | WEIGHT: 204 LBS | HEART RATE: 74 BPM | RESPIRATION RATE: 14 BRPM | SYSTOLIC BLOOD PRESSURE: 129 MMHG

## 2017-07-25 DIAGNOSIS — G89.4 CHRONIC PAIN SYNDROME: Primary | ICD-10-CM

## 2017-07-25 DIAGNOSIS — M51.36 LUMBAR DEGENERATIVE DISC DISEASE: ICD-10-CM

## 2017-07-25 DIAGNOSIS — M53.3 COCCYGODYNIA: ICD-10-CM

## 2017-07-25 DIAGNOSIS — M96.1 LUMBAR POST-LAMINECTOMY SYNDROME: ICD-10-CM

## 2017-07-25 DIAGNOSIS — M47.816 SPONDYLOSIS OF LUMBAR REGION WITHOUT MYELOPATHY OR RADICULOPATHY: ICD-10-CM

## 2017-07-25 DIAGNOSIS — M54.17 LUMBOSACRAL RADICULOPATHY: ICD-10-CM

## 2017-07-25 NOTE — PROGRESS NOTES
\[facility] [place] [facility]\"" Resources for Pain Management  Interventional Pain Management Consultation History & Physical    PATIENT NAME:  Holly Echols     YOB: 1970    DATE OF SERVICE:   7/25/2017      CHIEF COMPLAINT:  Back Pain      REASON FOR VISIT:   Holly Echols presents to the pain clinic today for follow on evaluation and to consider interventional pain management options as indicated for the type and location of the pain the patient is presenting with. HISTORY OF PRESENT ILLNESS:   Patient presents for reevaluation and to reconsider other interventional procedures as may be indicated. By way of review, I had initially seen this patient February 15, 2017. She had been referred to us by Dr. Niki Boo for consideration for bilateral sacroiliac and trochanteric bursa injection procedures. She actually underwent these procedures, she did not have much benefit from these procedures. When I initially saw her February 15, 2017, I found her to have chronic low back pain, bilateral buttocks pain, bilateral hip pain, all of long-standing duration. She endorsed low back pain radiating across her low back to both hips and both buttock areas. She is tried many courses of home exercises and physical therapy without benefit. Her pain is increased with prolonged sitting standing and walking, and other axial loading maneuvers. Her pain is further increased with lumbar facet loading maneuvers including lumbar twisting and turning, extension and flexion. Patient is noted to have had at least 3-4 low back surgeries. She is noted to currently have lumbar decompression and fusion L4-S1. She had redo low back operation by Dr. Pako Hare at some point in the past.  Fusion procedure was L4-S1, L3-4, to S1 on August 2008. She has had spinal leak that had to be repaired.   She has had 2-3 previous lumbar laminectomies, and 1-2 lumbar fusions, with redo lumbar decompression and fusion from L3-S1 in 2008. Patient had a lumbar MRI in [facility]. MRI was performed February 1, 2017. She has postoperative changes with bilateral laminectomies L4-5 and L5-S1 posterior metal fixation L4-S1 is noted. Scar tissue within the right L5 right S1 lateral recesses are noted. Mild annular bulging and 2 mm retrolisthesis with mild left neuroforaminal narrowing L3-4. L2-3 mild annular bulging and 2 mm retrolisthesis. Overall she is noted to have had at least 2 previous lumbar spine surgeries including lumbar spine decompression surgeries ×2, CSF leak ×1, lumbar spinal fusion L4-S1. As stated she underwent bilateral sacroiliac as well as trochanteric bursa injections with very little benefit. She endorses axial and paraspinal pain at today's evaluation. She has pain that radiates across her low back. She has pain at and also above her previous fusion areas, essentially throughout the entire lumbar and sacral spinal area. She endorses aching and throbbing pain of the areas as noted. Pain is made worse with lumbar axial loading including prolonged sitting standing and walking. Pain is further made worse with lumbar facet loading including lumbar twisting and turning, extension and flexion. Medications help with the pain to some extent. She takes 81 mg baby aspirin daily. She is not otherwise on any blood thinners. Patient also relates that she has had multiple abdominal surgical procedures. She had complications from a uterine ablation which required many abdominal operations, approximately 17 all in all. I am sure she has multiple abdominal adhesions and scar tissue. Patient states she has massive amounts of abdominal scar tissue. ASSESSMENT/OPTIONS: as follows. We discussed options. Patient is presenting with chronic and refractory low back pain, leg pain, abdominal pain, pelvic pain.   She has had a number of previous lumbar spine surgeries, including 2 lumbar decompressions, spinal fluid leak repair, and to lumbar spine decompressions and fusions. She has chronic and dramatic, extensive low back pain refractory to many many previous epidural steroid injections. She is fused from L4-S1. I am precluded from even attempting lumbar radiofrequency neurotomy procedures due to extensive level of the fusion as well as previous surgical scarring. Epidural steroid injections have offered no help whatsoever. She further has up to 17 previous abdominal surgeries and now has extensive abdominal adhesions and pelvic scarring and adhesions. Medications are not helping her. She is tried extensive physical therapy and other non-interventional procedures in the past that have not helped. Her best attempt at any durable pain relief would be a spinal cord stimulator trial and placement. She is having yet another lumbar surgical procedure, this is removal of 3 epi-sacral lipomas. I believe this is going to be done  or so. She will obviously be sore for a while. She has requested that I at least the mid initial consult to our pain psychologist Dr. Bal Merchant in anticipation of spinal cord stimulator trial placement. I discussed this procedure with the patient extensively today, using  spinal cord stimulator model as well as lumbar spine model to describe the procedure. I discussed extensively risk and benefits, indications contraindications and side effects of the procedure. Patient understands and wishes to proceed. She has no further questions. She will wait until after she is recovered from her presacral lipoma surgery. MRI Results (most recent):    Results from Orders Only encounter on 17   MRI PELV WO CONT        PAST MEDICAL HISTORY:   The patient  has a past medical history of Acid reflux; Bipolar 1 disorder ([address] Depression; Diabetes ([address] Essential hypertension;  Heart attack ([address] (); and Vision decreased. PAST SURGICAL HISTORY:   The patient  has a past surgical history that includes cardiac surg procedure unlist (05/22/2014); gyn (1998); orthopaedic; orthopaedic; orthopaedic; appendectomy; abdomen surgery proc unlisted; and other surgical.    CURRENT MEDICATIONS:   The patient has a current medication list which includes the following prescription(s): oxycodone er, oxycodone-acetaminophen, prednisone, insulin glargine, tapentadol er, metformin, alprazolam, oxybutynin, ropinirole, estradiol, topiramate er, clonazepam, lamotrigine, clonidine hcl, paroxetine, lisinopril, and aspirin. ALLERGIES:     Allergies   Allergen Reactions    Iodine Anaphylaxis    Shellfish Derived Anaphylaxis    Codeine Hives    Haldol [Haloperidol Lactate] Other (comments)     violent    Imitrex [Sumatriptan] Swelling    Penicillins Hives    Ultram [Tramadol] Hives       FAMILY HISTORY:   The patient family history includes Diabetes in her maternal grandmother and sister; Hypertension in her father, maternal grandfather, maternal grandmother, and mother. SOCIAL HISTORY:   The patient  reports that she quit smoking about 3 years ago. Her smoking use included Cigarettes. She has never used smokeless tobacco. The patient  reports that she does not drink alcohol. She also  reports that she uses illicit drugs, including Marijuana and Hallucinogenics. REVIEW OF SYSTEMS:    The patient denies fever, chills, weight loss (Constitutional), rash, itching (Skin), tinnitus, congestion (HENT), blurred vision, photophobia (Eyes), palpitations, orthopnea (Cardiovascular), hemoptysis, wheezing (Respiratory), nausea, vomiting, diarrhea (Gastrointestinal), dysuria, hematuria, urgency (Genitourinary), bowel or bladder incontinence, loss of consciousness (Neurologic), suicidal or homicidal ideation or hallucinations (Psychiatric). Denies swelling, axillary or groin masses (Lymphatic).            PHYSICAL EXAM:  VS:   Visit Vitals    BP 129/85    Pulse 74    Temp 98.8 °F (37.1 °C)    Resp 14    Ht 5' 6\" (1.676 m)    Wt 92.5 kg (204 lb)    BMI 32.93 kg/m2     General: Well-developed and well-nourished. Body habitus consistent with recorded height and weight and the calculated BMI. Apparent distress due to low back and abdominal and pelvic pain. Head: Normocephalic, atraumatic. Skin: Inspection of the skin reveals no rashes, lesions or infection. CV: Regular rate. No murmurs or rubs noted. No peripheral edema noted. Pulm: Respirations are even and unlabored. Extr: No clubbing, cyanosis, or edema noted. Musculoskeletal:  1. Cervical spine  Full ROM. No paraspinous tenderness at any level. There is no scoliosis, asymmetry, or musculoskeletal defect. 2. Thoracic spine  Full ROM. No paraspinous tenderness at any level. There is no scoliosis, asymmetry, or musculoskeletal defect. 3. Lumbar spine decreased range of motion all axes . Paraspinous tenderness throughout the lumbar spine . SI joints are nontender bilaterally. There is no scoliosis, asymmetry, or musculoskeletal defect. 4. Right upper extremity  Full ROM. 5/5 muscle strength in all muscle groups. No pain or tenderness in shoulder, elbow, wrist, or hand. 5. Left upper extremity  Full ROM. 5/5 muscle strength in all muscle groups. No pain or tenderness in shoulder, elbow, wrist, or hand. 6. Right lower extremity  Full ROM. 5/5 muscle strength in all muscle groups. No pain, tenderness, or swelling in the hip, knee, ankle or foot. 7. Left lower extremity  Full ROM. 5/5 muscle strength in all muscle groups. No pain, tenderness, or swelling in the hip, knee, ankle or foot. Neurological:  1. Mental Status - Alert, awake and oriented. Speech is clear and appropriate. 2. Cranial Nerves - Extraocular muscles intact bilaterally. Cranial nerves II-XII grossly intact bilaterally. 3. Gait - antalgic   4. Reflexes - 2+ and symmetric throughout.   5. Sensation - Intact to light touch and pin prick. 6. Provocative Tests -  Straight leg raise negative bilaterally. Psychological:  1. Mood and affect  Appropriate. 2. Speech  Appropriate. 3. Though content  Appropriate. 4. Judgment  Appropriate. ASSESSMENT:      ICD-10-CM ICD-9-CM    1. Chronic pain syndrome G89.4 338.4 REFERRAL TO PSYCHOLOGY   2. Lumbar post-laminectomy syndrome M96.1 722.83 REFERRAL TO PSYCHOLOGY   3. Lumbosacral radiculopathy M54.17 724.4 REFERRAL TO PSYCHOLOGY   4. Coccygodynia M53.3 724.79 REFERRAL TO PSYCHOLOGY   5. Spondylosis of lumbar region without myelopathy or radiculopathy M47.816 721.3 REFERRAL TO PSYCHOLOGY   6. Lumbar degenerative disc disease M51.36 722.52 REFERRAL TO PSYCHOLOGY           PLAN:    1.    I have thoroughly discussed the risks and benefits, indications, contraindications, and side effects of any and procedures that were mentioned at today's patient visit. I have used a skeleton model to explain all procedures, as well as to provide added emphasis regarding procedures and as well for patient education purposes. I have answered all questions in great detail, and I have obtained verbal confirmation for all procedures planned with the patient. 3.    I have reviewed in great detail today the patient's MRI and other imaging studies with the patient. I have explained to the patient their condition using both actual recent and relevant images insofar as I am able to obtain actual images. I have used a skeleton model for added emphasis as well as patient education. 4.    I have advised patient to have a primary care provider continue to care for their health maintenance and general medical conditions. 5,    I have placed appropriate referrals to specialty care providers as I have deemed necessary through today's clinical consultation with the patient.   5.    I have explained to the patient that if any significant side effects, issues, problems, concerns, or perceived complications may have arisen at around the time of the patient's procedures, they should either call the pain management clinic or go to the emergency room immediately for medical provider evaluation. 6.   I have encouraged all patients to call the pain management clinic with any questions or concerns that they may have pertaining to their procedures. DISPOSITION:   The patients condition and plan were discussed at length and all questions were answered. The patient agrees with the plan. A total of 25 minutes was spent with the patient of which over half of the time was spent counseling the patient. Elsa Hudson MD 7/25/2017 3:04 PM    Note: Although these clinic notes were documented by the provider at the time of the exam, they have not been proofed and are subject to transcription variance.

## 2017-08-18 ENCOUNTER — TELEPHONE (OUTPATIENT)
Dept: PAIN MANAGEMENT | Age: 47
End: 2017-08-18

## 2017-08-31 ENCOUNTER — OFFICE VISIT (OUTPATIENT)
Dept: PAIN MANAGEMENT | Age: 47
End: 2017-08-31

## 2017-08-31 VITALS
HEART RATE: 83 BPM | TEMPERATURE: 97.5 F | SYSTOLIC BLOOD PRESSURE: 122 MMHG | RESPIRATION RATE: 20 BRPM | BODY MASS INDEX: 32.93 KG/M2 | DIASTOLIC BLOOD PRESSURE: 88 MMHG | WEIGHT: 204 LBS

## 2017-08-31 DIAGNOSIS — M76.892 ENTHESOPATHY OF HIP REGION ON BOTH SIDES: ICD-10-CM

## 2017-08-31 DIAGNOSIS — M53.3 COCCYGODYNIA: ICD-10-CM

## 2017-08-31 DIAGNOSIS — M46.1 SACROILIITIS (HCC): ICD-10-CM

## 2017-08-31 DIAGNOSIS — G89.4 CHRONIC PAIN SYNDROME: ICD-10-CM

## 2017-08-31 DIAGNOSIS — M54.17 LUMBOSACRAL RADICULOPATHY: ICD-10-CM

## 2017-08-31 DIAGNOSIS — M96.1 LUMBAR POST-LAMINECTOMY SYNDROME: Primary | ICD-10-CM

## 2017-08-31 DIAGNOSIS — M51.36 LUMBAR DEGENERATIVE DISC DISEASE: ICD-10-CM

## 2017-08-31 DIAGNOSIS — M47.816 SPONDYLOSIS OF LUMBAR REGION WITHOUT MYELOPATHY OR RADICULOPATHY: ICD-10-CM

## 2017-08-31 DIAGNOSIS — M76.891 ENTHESOPATHY OF HIP REGION ON BOTH SIDES: ICD-10-CM

## 2017-08-31 DIAGNOSIS — Z79.899 ENCOUNTER FOR LONG-TERM (CURRENT) USE OF MEDICATIONS: ICD-10-CM

## 2017-08-31 RX ORDER — OXYCODONE HCL 10 MG/1
10 TABLET, FILM COATED, EXTENDED RELEASE ORAL EVERY 12 HOURS
Qty: 60 TAB | Refills: 0 | Status: CANCELLED | OUTPATIENT
Start: 2017-09-19

## 2017-08-31 RX ORDER — OXYCODONE AND ACETAMINOPHEN 10; 325 MG/1; MG/1
1 TABLET ORAL
Qty: 60 TAB | Refills: 0 | Status: SHIPPED | OUTPATIENT
Start: 2017-09-19 | End: 2017-09-26

## 2017-08-31 RX ORDER — HYDROCODONE BITARTRATE 20 MG/1
TABLET, EXTENDED RELEASE ORAL
Qty: 30 TAB | Refills: 0 | Status: SHIPPED | OUTPATIENT
Start: 2017-08-31 | End: 2018-02-07

## 2017-08-31 NOTE — PATIENT INSTRUCTIONS
PLAN / Pt Instructions:  1. Continue current plan with no evidence of addiction or diversion. 2. Stable on current medication without adverse events. 3. Refilled Oxycodone /APAP 10 mg/325 mg take one tablet twice a day prn  4. Discontinue Oxycontin 10 mg tab you must properly destroy any additional medication  5. Add Hysingla ER 20 mg, take one tablet daily   6. Continue with psych evaluation and clearance for spinal cord stimulator  7. Discussed risks of addiction, dependency, and opioid induced hyperalgesia. 8. Reviewed with patient benefits of home exercise, and lifestyle changes to assist the patient in self-management of symptoms.   9. Return to clinic in one month to reassess
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
## 2017-08-31 NOTE — PROGRESS NOTES
Nursing Notes    Patient presents to the office today in follow-up. Patient rates her pain at 5/10 on the numerical pain scale. Reviewed medications with counts as follows:    Rx Date filled Qty Dispensed Pill Count Last Dose Short   Percocet 10/325mg  08/16/17 60 8 This am  Yes ( nine days short )    oxycontin 10mg  08/16/17 60 32 This am  No                                     Comments:     POC UDS was not performed in office today    Any new labs or imaging since last appointment? YES ; pre-op labwork and back x-rays at emergency dept     Have you been to an emergency room (ER) or urgent care clinic since your last visit? YES ; [facility] for surgical complications            Have you been hospitalized since your last visit? NO     If yes, where, when, and reason for visit? Have you seen or consulted any other health care providers outside of the [facility]  since your last visit? YES     If yes, where, when, and reason for visit? Back surgeon, emergency dept physicians       HM deferred to pcp. Patient noted to be short on medications , percocet 10/325mg ( nine days short )  this office visit; advised to follow dosing schedule as prescribed by [facility].

## 2017-08-31 NOTE — MR AVS SNAPSHOT
Visit Information Date & Time Provider Department Dept. Phone Encounter #  
 8/31/2017 10:00 AM [facility] [id number] Follow-up Instructions Return in about 1 month (around 9/30/2017). Upcoming Health Maintenance Date Due DTaP/Tdap/Td series (1 - Tdap) 1/30/1993 PAP AKA CERVICAL CYTOLOGY 1/30/1993 INFLUENZA AGE 9 TO ADULT 8/1/2017 Allergies as of 8/31/2017  Review Complete On: 8/31/2017 By: Priscilla Butler LPN Severity Noted Reaction Type Reactions Iodine High 02/15/2017    Anaphylaxis Shellfish Derived High 02/21/2017    Anaphylaxis Codeine  02/15/2017    Hives Haldol [Haloperidol Lactate]  02/15/2017    Other (comments)  
 violent Imitrex [Sumatriptan]  02/15/2017    Swelling Penicillins  02/15/2017    Hives Ultram [Tramadol]  02/15/2017    Hives Current Immunizations  Never Reviewed No immunizations on file. Not reviewed this visit Vitals BP Pulse Temp Resp Weight(growth percentile) BMI  
 122/88 (BP 1 Location: Left arm, BP Patient Position: Sitting) 83 97.5 °F (36.4 °C) (Oral) 20 204 lb (92.5 kg) 32.93 kg/m2 OB Status Smoking Status Hysterectomy Former Smoker Vitals History BMI and BSA Data Body Mass Index Body Surface Area  
 32.93 kg/m 2 2.08 m 2 Preferred Pharmacy Pharmacy Name Phone [facility] [phone] Your Updated Medication List  
  
   
This list is accurate as of: 8/31/17 12:20 PM.  Always use your most recent med list.  
  
  
  
  
 aspirin 325 mg tablet Generic drug:  aspirin Take 325 mg by mouth daily. clonazePAM 2 mg tablet Commonly known as:  Alexia Banner Take  by mouth two (2) times a day. cloNIDine HCl 0.1 mg tablet Commonly known as:  CATAPRES Take  by mouth two (2) times a day. estradiol 0.5 mg tablet Commonly known as:  ESTRACE Take  by mouth daily. HYDROcodone bitartrate 20 mg ER tablet Commonly known as:  HYSINGLA  
*DO NOT CRUSH, CHEW, OR DISSOLVE TABLET*  Indications: Severe Pain  
  
 lamoTRIgine 200 mg tablet Commonly known as: LaMICtal  
Take  by mouth daily. LANTUS 100 unit/mL injection Generic drug:  insulin glargine  
by SubCUTAneous route nightly. lisinopril 20 mg tablet Commonly known as:  Zelalem Royer Take  by mouth daily. metFORMIN 500 mg tablet Commonly known as:  GLUCOPHAGE Take 500 mg by mouth two (2) times daily (with meals). oxybutynin 5 mg tablet Commonly known as:  LDWEGVZK Take 5 mg by mouth three (3) times daily. oxyCODONE ER 10 mg ER tablet Commonly known as:  OxyCONTIN Take 1 Tab by mouth every twelve (12) hours. Max Daily Amount: 20 mg. For chronic, severe pain Start taking on:  9/16/2017 * oxyCODONE-acetaminophen  mg per tablet Commonly known as:  PERCOCET Take 1 Tab by mouth two (2) times daily as needed for Pain. Max Daily Amount: 2 Tabs. For breakthrough pain * oxyCODONE-acetaminophen  mg per tablet Commonly known as:  PERCOCET Take 1 Tab by mouth two (2) times daily as needed for Pain. Max Daily Amount: 2 Tabs. For breakthrough pain Start taking on:  9/19/2017 PARoxetine 30 mg tablet Commonly known as:  PAXIL Take 30 mg by mouth daily. predniSONE 10 mg tablet Commonly known as:  Mary Smoker Take 3 Tabs by mouth daily. Take with food for up to five days for costochondritis  
  
 rOPINIRole 0.5 mg tablet Commonly known as:  Amanda Alpha Take  by mouth three (3) times daily. tapentadol  mg tablet Commonly known as:  Charla [place] ER Take 1 Tab by mouth two (2) times a day. Max Daily Amount: 200 mg. TROKENDI  mg capsule Generic drug:  topiramate ER Take  by mouth daily. XANAX 1 mg tablet Generic drug:  ALPRAZolam  
Take  by mouth. * Notice: This list has 2 medication(s) that are the same as other medications prescribed for you. Read the directions carefully, and ask your doctor or other care provider to review them with you. Prescriptions Printed Refills  
 oxyCODONE-acetaminophen (PERCOCET)  mg per tablet 0 Starting on: 9/19/2017 Sig: Take 1 Tab by mouth two (2) times daily as needed for Pain. Max Daily Amount: 2 Tabs. For breakthrough pain  
 Class: Print Route: Oral  
 HYDROcodone bitartrate (HYSINGLA) 20 mg ER tablet 0 Sig: *DO NOT CRUSH, CHEW, OR DISSOLVE TABLET*  Indications: Severe Pain Class: Print Follow-up Instructions Return in about 1 month (around 9/30/2017). Patient Instructions PLAN / Pt Instructions: 1. Continue current plan with no evidence of addiction or diversion. 2. Stable on current medication without adverse events. 3. Refilled Oxycodone /APAP 10 mg/325 mg take one tablet twice a day prn 
4. Discontinue Oxycontin 10 mg tab you must properly destroy any additional medication 5. Add Hysingla ER 20 mg, take one tablet daily 6. Continue with psych evaluation and clearance for spinal cord stimulator 7. Discussed risks of addiction, dependency, and opioid induced hyperalgesia. 8. Reviewed with patient benefits of home exercise, and lifestyle changes to assist the patient in self-management of symptoms. 9. Return to clinic in one month to reassess [facility]! Dear Chrystal Jonas: Thank you for requesting a [employer] account. Our records indicate that you already have an active [employer] account. You can access your account anytime at [email or web address] Did you know that you can access your hospital and ER discharge instructions at any time in [employer]? You can also review all of your test results from your hospital stay or ER visit. Additional Information If you have questions, please visit the Frequently Asked Questions section of the [email or web address] website at [email or web address] Remember, [employer] is NOT to be used for urgent needs. For medical emergencies, dial 911. Now available from your iPhone and Android! Please provide this summary of care documentation to your next provider. Your primary care clinician is listed as TAYE GASCA. If you have any questions after today's visit, please call [phone].

## 2017-09-26 ENCOUNTER — OFFICE VISIT (OUTPATIENT)
Dept: PAIN MANAGEMENT | Age: 47
End: 2017-09-26

## 2017-09-26 VITALS
HEIGHT: 66 IN | BODY MASS INDEX: 32.3 KG/M2 | SYSTOLIC BLOOD PRESSURE: 123 MMHG | TEMPERATURE: 98.4 F | HEART RATE: 84 BPM | DIASTOLIC BLOOD PRESSURE: 85 MMHG | WEIGHT: 201 LBS

## 2017-09-26 DIAGNOSIS — G89.4 CHRONIC PAIN SYNDROME: ICD-10-CM

## 2017-09-26 DIAGNOSIS — Z79.899 ENCOUNTER FOR LONG-TERM (CURRENT) USE OF MEDICATIONS: ICD-10-CM

## 2017-09-26 DIAGNOSIS — M46.1 SACROILIITIS (HCC): ICD-10-CM

## 2017-09-26 DIAGNOSIS — M53.3 COCCYGODYNIA: ICD-10-CM

## 2017-09-26 DIAGNOSIS — M51.36 LUMBAR DEGENERATIVE DISC DISEASE: Primary | ICD-10-CM

## 2017-09-26 DIAGNOSIS — M96.1 LUMBAR POST-LAMINECTOMY SYNDROME: ICD-10-CM

## 2017-09-26 RX ORDER — OXYCODONE AND ACETAMINOPHEN 10; 325 MG/1; MG/1
1 TABLET ORAL
Qty: 60 TAB | Refills: 0 | Status: SHIPPED | OUTPATIENT
Start: 2017-10-19 | End: 2017-10-25

## 2017-09-26 NOTE — PROGRESS NOTES
Nursing Notes    Patient presents to the office today in follow-up. Patient rates her pain at 10/10 on the numerical pain scale. Reviewed medications with counts as follows:    Rx Date filled Qty Dispensed Pill Count Last Dose Short     hysingla ER 20mg 09/07/17 30 10 today no   Percocet 10-325mg 09/19/17 60 43 today no                                  Comments:     POC UDS was not performed in office today    Any new labs or imaging since last appointment? YES, MRI of back     Have you been to an emergency room (ER) or urgent care clinic since your last visit? YES,[facility]          Have you been hospitalized since your last visit? YES     If yes, where, when, and reason for visit? Have you seen or consulted any other health care providers outside of the [address] [place] [address]  since your last visit? YES     If yes, where, when, and reason for visit? HM deferred to pcp.

## 2017-09-26 NOTE — PROGRESS NOTES
HISTORY OF PRESENT ILLNESS  Jarred Boss is a 41 y.o. female    HPI: Ms. Darcy Hoyt  presents for follow up of chronic pain due to coccydynia, sacroiliitis, trochanteric bursitis, and lumbar postlaminectomy syndrome. She has previously tried and failed a panoply of medications, including Opana ER, hydromorphone, Nucynta er (cost), OxyContin, MS contin, hydrocodone, Percocet, tramadol (allergic), Lyrica, Cymbalta, Levorphanol and codeine. Today this patient reports that she is not doing that well. On 8/16/2017, she had two subcutaneous lipomas removed from her lower back on the right and left side of her hip region. She reports she has new thoracic back pain since the surgery. She states that the surgical team, \"flipped me on the table after I was given fentanyl even though I told them I did not want fentanyl because of my sister's overdose and I wanted to get on the table myself\". Following her post op visit with Dr. Rich Alcaraz, Ms. Darcy Hoyt returned to see Dr Rich Alcaraz for her acute upper back pain. She believes that this new acute pain resulted from her \"flip\" on the table before surgery. She got a thoracic MRI done with and w/o contrast ordered by Dr Rich Alcaraz on the 22 of September. Please see results below:  Upper lumbar spine :WNL  Impression   T-7-8 Moderate right paracentral disc extrusion  T8-9 Small moderate left disc protrusion     She will follow up with her PCP, Dr Nova Persaud today to go over her MRI results. She reports [employer] will pay for this\". She claims that the Hysingla ER 20 mg did nothing to help her pain since the last visit. She wants to return to the oxymorphone 10 mg every 12 hours. She states that it \"helped more than any of the other meds\". She denies any side effects from oxymorphone 10 mg when taken in the past. Jarred Boss reports no change in sleep or constipation. Ms Darcy Hoyt was advised to discontinue and properly dispose of the remaining Hysingla ER 20 mg tablets.   She was provided education on proper medication disposal options as indicated by the FDA/ERLINDA. She was also advised that failing to properly dispose of medications that are no longer part of her current regimen would be considered non-compliance with the treatment plan. We have discussed the loss of current pain management neurologist, Dr. Alison Hillman working as a provider in this pain management practice. We discussed that this change may involve adjustments to current pain medications in the future. This patient will be given the option to transfer to another pain management clinic if desired. This patient understands and verbally agrees. Current medication management consists of: Oxycodone /APAP 10 mg/325 mg take one tablet twice a day prn, Pjuryuwetek18 mg tab, 1 tablet PO every 12 hours     Medications are helping with pain control and quality of life. Her pain is 3-4/10 with medication and 8/10 without. Pt describes pain as aching, stabbing, and burning. Aggravating factors include standing, sitting, and walking. Relieved with rest, medication, and avoiding painful activities. The patient reports 60% relief with current medications. Current treatment is helping to improve general activity, mood, walking, sleep, enjoyment of life    Measuring clinical outcomes of chronic pain patients: score 19/28; the lower the number the better the outcome. Pain Meds and Quality Of Life have been reviewed. Nonpharmacologic therapy and non-opioid pharmacologic therapy were considered. If opioid therapy is prescribed, this is only if the expected benefits are anticipated to outweigh risks. She  is otherwise doing well with no other complaints today. She denies any adverse events including nausea, vomiting, dizziness, increased constipation, hallucinations, or seizures. The patient reports functional improvement and QOL with pain medication.      Vitals:    09/26/17 0942   BP: 123/85   Pulse: 84   Temp: 98.4 °F (36.9 °C)   Weight: 91.2 kg (201 lb)   Height: 5' 6\" (1.676 m)   PainSc:  10 - Worst pain ever   PainLoc: Back         Allergies   Allergen Reactions    Iodine Anaphylaxis    Shellfish Derived Anaphylaxis    Codeine Hives    Haldol [Haloperidol Lactate] Other (comments)     violent    Imitrex [Sumatriptan] Swelling    Penicillins Hives    Ultram [Tramadol] Hives       Past Surgical History:   Procedure Laterality Date    ABDOMEN SURGERY PROC UNLISTED      cholecystectomy    CARDIAC SURG PROCEDURE UNLIST  05/22/2014    stent placed    HX APPENDECTOMY      HX GYN  1998    hysterectomy    HX ORTHOPAEDIC      3 discestomies , lumbar    HX ORTHOPAEDIC      spinal tear repair    HX ORTHOPAEDIC      L3-4-S1 fusion    HX OTHER SURGICAL      Breast reduction       ROS   Constitutional: Positive for fever and malaise/fatigue. Negative for chills and weight loss. Fever after back surgery 16 Aug.   HENT: Positive for sore throat. Negative for congestion, ear discharge, ear pain, hearing loss, nosebleeds and sinus pain. Eyes: Negative for blurred vision, double vision and pain. Respiratory: Negative for cough, shortness of breath and wheezing. Cat allergy   Cardiovascular: Negative for chest pain, palpitations and leg swelling. Gastrointestinal: Positive for diarrhea and heartburn. Negative for abdominal pain, constipation, nausea and vomiting. GERD sees PCP   Genitourinary: Negative for dysuria, frequency and urgency. Musculoskeletal: Positive for back pain, joint pain and myalgias. Negative for falls and neck pain. Skin: Negative for itching and rash. Neurological: Negative for dizziness, tingling, tremors, seizures, loss of consciousness, weakness and headaches. Psychiatric/Behavioral: Positive for depression. Negative for hallucinations and suicidal ideas. The patient is nervous/anxious and has insomnia.          Sees psychiatrist/phycologist -Bipolar       Physical Exam Constitutional: She is oriented to person, place, and time and well-developed, well-nourished, and in no distress. She appears healthy. Non-toxic appearance. HENT:   Head: Normocephalic and atraumatic. Eyes: Right eye exhibits no discharge. Left eye exhibits no discharge. Neck: Normal range of motion. Neck supple. Pulmonary/Chest: Effort normal.   Musculoskeletal: She exhibits tenderness. Left shoulder: She exhibits tenderness. Right hip: She exhibits tenderness. Left hip: She exhibits tenderness. Lumbar back: She exhibits tenderness and pain. Neurological: She is alert and oriented to person, place, and time. Skin: Skin is warm and dry. She is not diaphoretic. Psychiatric: Affect normal. Her mood appears anxious. Nursing note and vitals reviewed. ASSESSMENT:    1. Lumbar degenerative disc disease    2. Sacroiliitis ([address]    3. Lumbar post-laminectomy syndrome    4. Coccygodynia    5. Chronic pain syndrome    6. Encounter for long-term (current) use of medications        [facility] Prescription Monitoring Program was reviewed which does not demonstrate aberrancies and/or inconsistencies with regard to the historical prescribing of controlled medications to this patient by other providers. Medications were brought to visit today. Pill count was appropriate. When possible, non-drug therapy for chronic pain should be used as a first-line treatment. Physical therapy exercise regimens, chiropractic manipulation, meditation relaxation techniques, cognitive behavior therapy, acupuncture, yoga, Abdelrahman Chi,  transcutaneous electrical nerve stimulation (TENS), and application of moist heat can help alleviate pain .      Explained that realistic expectations and goals with chronic pain management are to maximize function and minimize pain with the understanding that limitations will exist both in the extent of relief that she may achieve, as well as thresholds of mg strengths that we will not exceed. Our role is to help the patient better cope with chronic pain utilizng a multimodal approach. The patients condition and plan were discussed. All questions were answered. The patient agrees with the plan. PLAN / Pt Instructions:  1. Continue current plan with no evidence of addiction or diversion. 2. Stable on current medication without adverse events. 3. Refilled Oxycodone /APAP 10 mg/325 mg take one tablet twice a day prn  4. Discontinue and properly dispose of any additional Hysingla ER 20 mg tablets per FDA/ERLINDA  5. Add Oxymorphone 10 mg, take one tablet po every 12 hours  6. Discussed risks of addiction, dependency, and opioid induced hyperalgesia. 7. Reviewed benefits of home exercise, and lifestyle changes to assist  in self-management of symptoms. 8. Return to clinic in one month to reassess         Medications Ordered Today   Medications    oxyMORphone (OPANA ER) 10 mg PO ER tablet     Sig: Take 1 Tab by mouth every twelve (12) hours. Max Daily Amount: 20 mg. for chronic, severe, refractory pain     Dispense:  60 Tab     Refill:  0    oxyCODONE-acetaminophen (PERCOCET)  mg per tablet     Sig: Take 1 Tab by mouth two (2) times daily as needed for Pain for up to 30 days. Max Daily Amount: 2 Tabs. Indications: Pain     Dispense:  60 Tab     Refill:  0       Prescription monitoring program reviewed. The patient  should keep track of total Tylenol intake and make sure liver function tests have been checked with primary care physician. Pain medications prescribed with the objective of pain relief and improved physical and psychosocial function in this patient. DISPOSITION  · Counseled patient on proper use of prescribed medications. · Counseled patient about chronic medical conditions and their relationship to anxiety and depression and recommended mental health support as needed.   · Reviewed with patient self-help tools, home exercise, and lifestyle changes to assist the patient in self-management of symptoms. · Reviewed with patient the treatment plan, goals of treatment plan, and limitations of treatment plan, to include the potential for side effects from medications and procedures. If side effects occur, it is the responsibility of the patient to inform the clinic so that a change in the treatment plan can be made in a safe manner. The patient is advised that stopping prescribed medication may cause an increase in symptoms and possible medication withdrawal symptoms. The patient is informed an emergency room evaluation may be necessary if this occurs. Spent 25 minutes with patient today reviewing the treatment plan, goals of treatment plan, and limitations of the treatment plan, to include the potential for side effects from medications and procedures. Sabrina Horvath PA-C 9/26/2017        Note: Please excuse any typographical errors. Voice recognition software was used for this note and may cause mistakes.

## 2017-10-25 ENCOUNTER — OFFICE VISIT (OUTPATIENT)
Dept: PAIN MANAGEMENT | Age: 47
End: 2017-10-25

## 2017-10-25 VITALS
DIASTOLIC BLOOD PRESSURE: 84 MMHG | SYSTOLIC BLOOD PRESSURE: 130 MMHG | RESPIRATION RATE: 12 BRPM | BODY MASS INDEX: 32.14 KG/M2 | HEART RATE: 83 BPM | HEIGHT: 66 IN | TEMPERATURE: 96.8 F | WEIGHT: 200 LBS

## 2017-10-25 DIAGNOSIS — M51.24 THORACIC DISC HERNIATION: Primary | ICD-10-CM

## 2017-10-25 DIAGNOSIS — M51.36 LUMBAR DEGENERATIVE DISC DISEASE: ICD-10-CM

## 2017-10-25 DIAGNOSIS — M96.1 LUMBAR POST-LAMINECTOMY SYNDROME: ICD-10-CM

## 2017-10-25 DIAGNOSIS — M47.816 SPONDYLOSIS OF LUMBAR REGION WITHOUT MYELOPATHY OR RADICULOPATHY: ICD-10-CM

## 2017-10-25 DIAGNOSIS — M54.17 LUMBOSACRAL RADICULOPATHY: ICD-10-CM

## 2017-10-25 DIAGNOSIS — M54.14 THORACIC RADICULOPATHY: ICD-10-CM

## 2017-10-25 DIAGNOSIS — G89.4 CHRONIC PAIN SYNDROME: ICD-10-CM

## 2017-10-25 DIAGNOSIS — M53.3 COCCYGODYNIA: ICD-10-CM

## 2017-10-25 NOTE — PROGRESS NOTES
[address] [place] [address] for Pain Management  Interventional Pain Management Consultation History & Physical    PATIENT NAME:  Lavern Adams     YOB: 1970    DATE OF SERVICE:   10/25/2017      CHIEF COMPLAINT:  Back Pain      REASON FOR VISIT:   Lavern Adams presents to the pain clinic today for follow on evaluation and to consider interventional pain management options as indicated for the type and location of the pain the patient is presenting with. HISTORY OF PRESENT ILLNESS:   Patient returns for follow-up evaluation and to review any interventions moving forward. Most recently, she had excision of sacral lipomas that were bothering her. When she awoke from her surgical procedure, she noted acute increase in posterior midthoracic pain. She underwent thoracic MRI on September 22, 2017. This has revealed a thoracic disc herniation with central disc extrusion at T7-8 slightly to the right of midline, contacting spinal cord. AP dimension of the thecal sac at this level is 10.5 mm. She also has moderate disc narrowing with desiccation and small left-sided disc protrusion at T8-9 with AP diameter 11.5 mm. She further had a bruise on her upper right arm after her surgical procedure, which is resolved. She was also complaining of this after her surgical procedure. I had seen her previously, and we had discussed risk and benefits is well as indications contraindications side effects of spinal cord stimulator trial and placement. This was intended for her low back and leg pain. She is status post lumbar spine surgery by Dr. Estella Jackson. Her surgical lumbar procedure appears absolutely fine, however the patient has continued low back and leg pain which I believe would be very amenable to spinal cord stimulator management of her continued pain. My concern at this point moving forward is that patient has a disc extrusion at T7-8 level.   There is very likely that stimulator lead, either percutaneous or paddle lead, would likely be placed at this level. With AP canal diameter 10.5 mm this will obviously be at an increased risk for placement of essentially foreign body at the same level. ASSESSMENT/OPTIONS: as follows. We discussed options. Patient would like to discuss her surgical options regarding her extruded thoracic disc at T7-8 with Dr. Mago Caraballo. Dr. Hamlet Orourke has done patients previous lumbar spine surgery, and the patient has a very good relationship with Dr. Hamlet Orourke. I will place a consult to Dr. Mago Caraballo for this. I would also like to hear Dr. Ena Runner opinion on spinal cord stimulator with regard to this new finding on thoracic MRI. I am also amenable to doing a series of intralaminar thoracic epidural steroid injections at T7-8 level for this patient if she is not a surgical candidate. MRI Results (most recent):    Results from Orders Only encounter on 05/09/17   MRI PELV WO CONT        PAST MEDICAL HISTORY:   The patient  has a past medical history of Acid reflux; Bipolar 1 disorder ([address] Depression; Diabetes ([address] Essential hypertension; Heart attack (2014); and Vision decreased. PAST SURGICAL HISTORY:   The patient  has a past surgical history that includes cardiac surg procedure unlist (05/22/2014); gyn (1998); orthopaedic; orthopaedic; orthopaedic; appendectomy; abdomen surgery proc unlisted; and other surgical.    CURRENT MEDICATIONS:   The patient has a current medication list which includes the following prescription(s): tapentadol, oxycodone-acetaminophen, insulin glargine, metformin, alprazolam, oxybutynin, ropinirole, estradiol, topiramate er, clonazepam, lamotrigine, clonidine hcl, paroxetine, lisinopril, aspirin, oxymorphone, and hydrocodone bitartrate.     ALLERGIES:     Allergies   Allergen Reactions    Iodine Anaphylaxis    Shellfish Derived Anaphylaxis    Codeine Hives    Haldol [Haloperidol Lactate] Other (comments)     violent    Imitrex [Sumatriptan] Swelling    Penicillins Hives    Ultram [Tramadol] Hives       FAMILY HISTORY:   The patient family history includes Diabetes in her maternal grandmother and sister; Hypertension in her father, maternal grandfather, maternal grandmother, and mother. SOCIAL HISTORY:   The patient  reports that she quit smoking about 3 years ago. Her smoking use included Cigarettes. She has never used smokeless tobacco. The patient  reports that she does not drink alcohol. She also  reports that she uses illicit drugs, including Marijuana and Hallucinogenics. REVIEW OF SYSTEMS:    The patient denies fever, chills, weight loss (Constitutional), rash, itching (Skin), tinnitus, congestion (HENT), blurred vision, photophobia (Eyes), palpitations, orthopnea (Cardiovascular), hemoptysis, wheezing (Respiratory), nausea, vomiting, diarrhea (Gastrointestinal), dysuria, hematuria, urgency (Genitourinary), bowel or bladder incontinence, loss of consciousness (Neurologic), suicidal or homicidal ideation or hallucinations (Psychiatric). Denies swelling, axillary or groin masses (Lymphatic). PHYSICAL EXAM:  VS:   Visit Vitals    /84    Pulse 83    Temp 96.8 °F (36 °C)    Resp 12    Ht 5' 6\" (1.676 m)    Wt 90.7 kg (200 lb)    BMI 32.28 kg/m2     General: Well-developed and well-nourished. Body habitus consistent with recorded height and weight and the calculated BMI. Apparent distress due to posterior thoracic pain, low back and leg pain. Head: Normocephalic, atraumatic. Skin: Inspection of the skin reveals no rashes, lesions or infection. CV: Regular rate. No murmurs or rubs noted. No peripheral edema noted. Pulm: Respirations are even and unlabored. Extr: No clubbing, cyanosis, or edema noted. Musculoskeletal:  1. Cervical spine  Full ROM. No paraspinous tenderness at any level.   There is no scoliosis, asymmetry, or musculoskeletal defect. 2. Thoracic spine decreased ROM. Paraspinous tenderness mid back bilaterally . There is no scoliosis, asymmetry, or musculoskeletal defect. 3. Lumbar spine decreased ROM. Paraspinous tenderness. SI joints are nontender bilaterally. There is no scoliosis, asymmetry, or musculoskeletal defect. 4. Right upper extremity  Full ROM. 5/5 muscle strength in all muscle groups. No pain or tenderness in shoulder, elbow, wrist, or hand. 5. Left upper extremity  Full ROM. 5/5 muscle strength in all muscle groups. No pain or tenderness in shoulder, elbow, wrist, or hand. 6. Right lower extremity  Full ROM. 5/5 muscle strength in all muscle groups. No pain, tenderness, or swelling in the hip, knee, ankle or foot. 7. Left lower extremity  Full ROM. 5/5 muscle strength in all muscle groups. No pain, tenderness, or swelling in the hip, knee, ankle or foot. Neurological:  1. Mental Status - Alert, awake and oriented. Speech is clear and appropriate. 2. Cranial Nerves - Extraocular muscles intact bilaterally. Cranial nerves II-XII grossly intact bilaterally. 3. Gait - antalgic   4. Reflexes - 2+ and symmetric throughout. 5. Sensation - Intact to light touch and pin prick. 6. Provocative Tests -  Straight leg raise negative bilaterally. Psychological:  1. Mood and affect  Appropriate. 2. Speech  Appropriate. 3. Though content  Appropriate. 4. Judgment  Appropriate. ASSESSMENT:      ICD-10-CM ICD-9-CM    1. Thoracic disc herniation M51.24 722.11 REFERRAL TO ORTHOPEDIC SURGERY   2. Thoracic radiculopathy M54.14 724.4 REFERRAL TO ORTHOPEDIC SURGERY   3. Chronic pain syndrome G89.4 338.4 REFERRAL TO ORTHOPEDIC SURGERY   4. Lumbar post-laminectomy syndrome M96.1 722.83 REFERRAL TO ORTHOPEDIC SURGERY   5. Spondylosis of lumbar region without myelopathy or radiculopathy M47.816 721.3 REFERRAL TO ORTHOPEDIC SURGERY   6.  Lumbar degenerative disc disease M51.36 722.52 REFERRAL TO ORTHOPEDIC SURGERY   7. Coccygodynia M53.3 724.79 REFERRAL TO ORTHOPEDIC SURGERY   8. Lumbosacral radiculopathy M54.17 724.4 REFERRAL TO ORTHOPEDIC SURGERY           PLAN:    1.    I have thoroughly discussed the risks and benefits, indications, contraindications, and side effects of any and procedures that were mentioned at today's patient visit. I have used a skeleton model to explain all procedures, as well as to provide added emphasis regarding procedures and as well for patient education purposes. I have answered all questions in great detail, and I have obtained verbal confirmation for all procedures planned with the patient. 3.    I have reviewed in great detail today the patient's MRI and other imaging studies with the patient. I have explained to the patient their condition using both actual recent and relevant images insofar as I am able to obtain actual images. I have used a skeleton model for added emphasis as well as patient education. 4.    I have advised patient to have a primary care provider continue to care for their health maintenance and general medical conditions. 5,    I have placed appropriate referrals to specialty care providers as I have deemed necessary through today's clinical consultation with the patient. 5.    I have explained to the patient that if any significant side effects, issues, problems, concerns, or perceived complications may have arisen at around the time of the patient's procedures, they should either call the pain management clinic or go to the emergency room immediately for medical provider evaluation. 6.   I have encouraged all patients to call the pain management clinic with any questions or concerns that they may have pertaining to their procedures. DISPOSITION:   The patients condition and plan were discussed at length and all questions were answered. The patient agrees with the plan.     A total of 25 minutes was spent with the patient of which over half of the time was spent counseling the patient. Geraldine Nina MD 10/26/2017 5:50 PM    Note: Although these clinic notes were documented by the provider at the time of the exam, they have not been proofed and are subject to transcription variance.

## 2017-11-14 ENCOUNTER — OFFICE VISIT (OUTPATIENT)
Dept: ORTHOPEDIC SURGERY | Age: 47
End: 2017-11-14

## 2017-11-14 VITALS
TEMPERATURE: 97.9 F | WEIGHT: 201.4 LBS | HEIGHT: 66 IN | SYSTOLIC BLOOD PRESSURE: 133 MMHG | DIASTOLIC BLOOD PRESSURE: 91 MMHG | OXYGEN SATURATION: 98 % | BODY MASS INDEX: 32.37 KG/M2 | HEART RATE: 97 BPM | RESPIRATION RATE: 19 BRPM

## 2017-11-14 DIAGNOSIS — G89.4 CHRONIC PAIN SYNDROME: ICD-10-CM

## 2017-11-14 DIAGNOSIS — M51.24 THORACIC DISC HERNIATION: Primary | ICD-10-CM

## 2017-11-14 NOTE — PROGRESS NOTES
[address]  [address]  [address]  Phone: [phone]  Fax: [phone]  INITIAL CONSULTATION  Patient: Favio Mckeon                MRN: [id number]       SSN: [id number]  YOB: 1970        AGE: 41 y.o. SEX: female  Body mass index is 32.51 kg/(m^2). PCP: Abner Vences MD  11/14/17    Chief Complaint   Patient presents with    Back Pain     SC         HISTORY OF PRESENT ILLNESS, RADIOGRAPHS, and PLAN:         HISTORY OF PRESENT ILLNESS:  Ms. Alfie Lisa is seen today at the request of Dr. Everardo Hyman and Dr. Yoseph Jarrell. Ms. Alfie Lisa is a 74-year-old female with a past history of previous myocardial infarction, diabetes, obesity, and bipolar disorder. She has had multiple back surgeries culminating in a lumbar fusion by Dr. Fletcher Hannon in 2008. She recently had lipomas resected from her low back and woke up with thoracic pain syndrome in addition. She was being evaluated by Dr. Everardo Hyman for a spinal cord stimulator trial.  An MRI was done and she was found to have T7-T8 and T8-T9 disc protrusions. Dr. Mitch Quintanilla was concerned that this may contraindicate utilization of spinal cord stimulation and referred her on to me. The patient denies bowel or bladder dysfunction, fever, chills, night sweats, weight loss or weight gain. She has no myelopathic symptoms. She just has some localized thoracic pain as well as some left shoulder pain. PHYSICAL EXAMINATION:  Physical examination demonstrates some chronic mild left extensor hallucis longus weakness, normal strength of hamstrings and quadriceps. No clonus. No Yobanis. No Babinski. She has some pain in the posterior thoracic cage, worse with inspiration. Normal strength of deltoids, biceps, triceps, and intrinsics. MRIs demonstrate normal cervical spine and mild disc protrusions at T7-T8 and T8-T9. No cord compression. No gliosis.   I would say there is copious room still around the canal, around the cord and copious room posterior to the cord. ASSESSMENT/PLAN: I discussed the matter at length with the patient. The patient is not a candidate for epidural steroids because of her diabetes. I see nothing surgical in her lumbar spine or in her thoracic spine. She has small disc protrusions without cord compression without neurology with some localized pain for the past several months. I do not think she is a candidate for injection therapy. She has chronic pain. She is being evaluated for various pain modality treatments through [facility]. I see no anatomic contraindications for consideration of spinal cord stimulation. She had a successful trial.  I would likely not choose a paddle. I try not to implant paddles in patients who already have preexisting thoracic pain syndromes. I would likely use a perc-permanent for her. This would minimize additional pain in the thoracic spine. It would also have less mass effect on her cord, but obviously before consideration of any stimulator we would have to have a wholly successful trial.    I will let Dr. Kyara Marquez know that I see no anatomic contraindication in my experience to consideration of a permanent spinal cord stimulator or utilization of a spinal cord stimulator in addressing her pain. I do not believe her disc protrusions in her thoracic spine are anatomically significant. She is having no neurology and more of a mild axial pain syndrome. I do not have anything else to offer with regards to her back pain. I think she is a good candidate for a chronic pain management program.       cc: Kaleb Anderson M.D. Vianney Smith M.D.               Past Medical History:   Diagnosis Date    Acid reflux     [facility]     Depression     bipolar    Diabetes [facility]     Essential hypertension     Heart attack 2014    Vision decreased        Family History   Problem Relation Age of Onset    Hypertension Mother     Hypertension Father     Diabetes Sister     Hypertension Maternal Grandmother     Diabetes Maternal Grandmother     Hypertension Maternal Grandfather        Current Outpatient Prescriptions   Medication Sig Dispense Refill    tapentadol (NUCYNTA) 50 mg tablet Take  by mouth.  oxyMORphone (OPANA ER) 10 mg PO ER tablet Take 1 Tab by mouth every twelve (12) hours. Max Daily Amount: 20 mg. for chronic, severe, refractory pain 60 Tab 0    HYDROcodone bitartrate (HYSINGLA) 20 mg ER tablet *DO NOT CRUSH, CHEW, OR DISSOLVE TABLET*  Indications: Severe Pain 30 Tab 0    oxyCODONE-acetaminophen (PERCOCET)  mg per tablet Take 1 Tab by mouth two (2) times daily as needed for Pain. Max Daily Amount: 2 Tabs. For breakthrough pain 60 Tab 0    insulin glargine (LANTUS) 100 unit/mL injection by SubCUTAneous route nightly.  metFORMIN (GLUCOPHAGE) 500 mg tablet Take 500 mg by mouth two (2) times daily (with meals).  ALPRAZolam (XANAX) 1 mg tablet Take  by mouth.  oxybutynin (DITROPAN) 5 mg tablet Take 5 mg by mouth three (3) times daily.  rOPINIRole (REQUIP) 0.5 mg tablet Take  by mouth three (3) times daily.  estradiol (ESTRACE) 0.5 mg tablet Take  by mouth daily.  topiramate ER (TROKENDI XR) 200 mg capsule Take  by mouth daily.  clonazePAM (KLONOPIN) 2 mg tablet Take  by mouth two (2) times a day.  lamoTRIgine (LAMICTAL) 200 mg tablet Take  by mouth daily.  PARoxetine (PAXIL) 30 mg tablet Take 30 mg by mouth daily.  lisinopril (PRINIVIL, ZESTRIL) 20 mg tablet Take  by mouth daily.  aspirin (ASPIRIN) 325 mg tablet Take 325 mg by mouth daily.  cloNIDine HCl (CATAPRES) 0.1 mg tablet Take  by mouth two (2) times a day.          Allergies   Allergen Reactions    Iodine Anaphylaxis    Shellfish Derived Anaphylaxis    Codeine Hives    Haldol [Haloperidol Lactate] Other (comments)     violent    Imitrex [Sumatriptan] Swelling    Penicillins Hives    Ultram [Tramadol] Hives       Past Surgical History:   Procedure Laterality Date    ABDOMEN SURGERY PROC UNLISTED      cholecystectomy    CARDIAC SURG PROCEDURE UNLIST  05/22/2014    stent placed    HX APPENDECTOMY      HX GYN  1998    hysterectomy    HX ORTHOPAEDIC      3 discestomies , lumbar    HX ORTHOPAEDIC      spinal tear repair    HX ORTHOPAEDIC      L3-4-S1 fusion    HX OTHER SURGICAL      Breast reduction       Past Medical History:   Diagnosis Date    Acid reflux     [facility]     Depression     bipolar    Diabetes [facility]     Essential hypertension     Heart attack 2014    Vision decreased        Social History     Social History    Marital status: UNKNOWN     Spouse name: N/A    Number of children: N/A    Years of education: N/A     Occupational History    Not on file. Social History Main Topics    Smoking status: Former Smoker     Types: Cigarettes     Quit date: 5/22/2014    Smokeless tobacco: Never Used    Alcohol use No    Drug use: Yes     Special: Marijuana, Hallucinogenics      Comment: [de-identified]    Sexual activity: Not on file     Other Topics Concern    Not on file     Social History Narrative           REVIEW OF SYSTEMS:   CONSTITUTIONAL SYMPTOMS:  Negative. EYES:  Negative. EARS, NOSE, THROAT AND MOUTH:  Negative. CARDIOVASCULAR:  Negative. RESPIRATORY:  Negative. GENITOURINARY: Negative. GASTROINTESTINAL:  Negative. INTEGUMENTARY (SKIN AND/OR BREAST):  Negative. MUSCULOSKELETAL: Per HPI.   ENDOCRINE/RHEUMATOLOGIC:  Negative. NEUROLOGICAL:  Per HPI. HEMATOLOGIC/LYMPHATIC:  Negative. ALLERGIC/IMMUNOLOGIC:  Negative. PSYCHIATRIC:  Negative.     PHYSICAL EXAMINATION:   Visit Vitals    BP (!) 133/91    Pulse 97    Temp 97.9 °F (36.6 °C) (Oral)    Resp 19    Ht 5' 6\" (1.676 m)    Wt 201 lb 6.4 oz (91.4 kg)    SpO2 98%    BMI 32.51 kg/m2    PAIN SCALE: 5/10    CONSTITUTIONAL: The patient is in no apparent distress and is alert and oriented x 3. HEENT: Normocephalic. Hearing grossly intact. NECK: Supple and symmetric. no tenderness, or masses were felt. RESPIRATORY: No labored breathing. CARDIOVASCULAR: The carotid pulses were normal. Peripheral pulses were 2+. CHEST: Normal AP diameter and normal contour without any kyphoscoliosis. LYMPHATIC: No lymphadenopathy was appreciated in the neck, axillae or groin. SKIN:  Negative for scars, rashes, lesions, or ulcers on the right upper, right lower, left upper, left lower and trunk. NEUROLOGICAL: Alert and oriented x 3. Ambulation without assistive device. FWB. EXTREMITIES:  See musculoskeletal.  MUSCULOSKELETAL:   Head and Neck:  Negative for misalignment, asymmetry, crepitation, defects, tenderness masses or effusions.  Left Upper Extremity: Inspection, percussion and palpation preformed. Andersons sign is negative.  Right Upper Extremity: Inspection, percussion and palpation preformed. Andersons sign is negative.  Spine, Ribs and Pelvis: Mid and low Back pain. Inspection, percussion and palpation preformed. Negative for misalignment, asymmetry, crepitation, defects, tenderness masses or effusions.  Left Lower Extremity: Inspection, percussion and palpation preformed. Negative straight leg raise.  Right Lower Extremity: Inspection, percussion and palpation preformed. Negative straight leg raise. SPINE EXAM:     Cervical spine: Neck is midline. Normal muscle tone. No focal atrophy is noted. Lumbar spine: No rash, ecchymosis, or gross obliquity. No focal atrophy is noted. ASSESSMENT    ICD-10-CM ICD-9-CM    1. Thoracic disc herniation M51.24 722.11    2. Chronic pain syndrome G89.4 338.4        Written by Leann Low, as dictated by Debby Meier MD.    I, Dr. Debby Meier MD, confirm that all documentation is accurate.

## 2017-11-14 NOTE — MR AVS SNAPSHOT
Visit Information Date & Time Provider Department Dept. Phone Encounter #  
 11/14/2017  9:00 AM Antwan Donovan MD [facility], [address] and [facility] [phone] [id number] Follow-up Instructions Return if symptoms worsen or fail to improve. Upcoming Health Maintenance Date Due DTaP/Tdap/Td series (1 - Tdap) 1/30/1993 PAP AKA CERVICAL CYTOLOGY 1/30/1993 Influenza Age 5 to Adult 8/1/2017 Allergies as of 11/14/2017  Review Complete On: 11/14/2017 By: Amari Brian LPN Severity Noted Reaction Type Reactions Iodine High 02/15/2017    Anaphylaxis Shellfish Derived High 02/21/2017    Anaphylaxis Codeine  02/15/2017    Hives Haldol [Haloperidol Lactate]  02/15/2017    Other (comments)  
 violent Imitrex [Sumatriptan]  02/15/2017    Swelling Penicillins  02/15/2017    Hives Ultram [Tramadol]  02/15/2017    Hives Current Immunizations  Never Reviewed No immunizations on file. Not reviewed this visit You Were Diagnosed With   
  
 Codes Comments Thoracic disc herniation    -  Primary ICD-10-CM: M51.24 
ICD-9-CM: 722.11 Chronic pain syndrome     ICD-10-CM: G89.4 ICD-9-CM: 338. 4 Vitals BP Pulse Temp Resp Height(growth percentile) Weight(growth percentile) (!) 133/91 97 97.9 °F (36.6 °C) (Oral) 19 5' 6\" (1.676 m) 201 lb 6.4 oz (91.4 kg) SpO2 BMI OB Status Smoking Status 98% 32.51 kg/m2 Hysterectomy Former Smoker BMI and BSA Data Body Mass Index Body Surface Area 32.51 kg/m 2 2.06 m 2 Preferred Pharmacy Pharmacy Name Phone [facility] [phone] Your Updated Medication List  
  
   
This list is accurate as of: 11/14/17  9:42 AM.  Always use your most recent med list.  
  
  
  
  
 aspirin 325 mg tablet Generic drug:  aspirin Take 325 mg by mouth daily. clonazePAM 2 mg tablet Commonly known as:  Rafiq [place] Take  by mouth two (2) times a day. cloNIDine HCl 0.1 mg tablet Commonly known as:  CATAPRES Take  by mouth two (2) times a day. estradiol 0.5 mg tablet Commonly known as:  ESTRACE Take  by mouth daily. HYDROcodone bitartrate 20 mg ER tablet Commonly known as:  HYSINGLA  
*DO NOT CRUSH, CHEW, OR DISSOLVE TABLET*  Indications: Severe Pain  
  
 lamoTRIgine 200 mg tablet Commonly known as: LaMICtal  
Take  by mouth daily. LANTUS 100 unit/mL injection Generic drug:  insulin glargine  
by SubCUTAneous route nightly. lisinopril 20 mg tablet Commonly known as:  Jacklynn Pares Take  by mouth daily. metFORMIN 500 mg tablet Commonly known as:  GLUCOPHAGE Take 500 mg by mouth two (2) times daily (with meals). NUCYNTA 50 mg tablet Generic drug:  tapentadol Take  by mouth. oxybutynin 5 mg tablet Commonly known as:  OIZBCRHH Take 5 mg by mouth three (3) times daily. oxyCODONE-acetaminophen  mg per tablet Commonly known as:  PERCOCET Take 1 Tab by mouth two (2) times daily as needed for Pain. Max Daily Amount: 2 Tabs. For breakthrough pain  
  
 oxyMORphone 10 mg ER tablet Commonly known as:  OPANA ER Take 1 Tab by mouth every twelve (12) hours. Max Daily Amount: 20 mg. for chronic, severe, refractory pain PARoxetine 30 mg tablet Commonly known as:  PAXIL Take 30 mg by mouth daily. rOPINIRole 0.5 mg tablet Commonly known as:  Linda Destini Take  by mouth three (3) times daily. TROKENDI  mg capsule Generic drug:  topiramate ER Take  by mouth daily. XANAX 1 mg tablet Generic drug:  ALPRAZolam  
Take  by mouth. Follow-up Instructions Return if symptoms worsen or fail to improve. Introducing [facility] & HEALTH SERVICES! Dear Arian Obrien: Thank you for requesting a [employer] account.   Our records indicate that you already have an active [employer] account. You can access your account anytime at [email or web address] Did you know that you can access your hospital and ER discharge instructions at any time in [employer]? You can also review all of your test results from your hospital stay or ER visit. Additional Information If you have questions, please visit the Frequently Asked Questions section of the [employer] website at [email or web address] Remember, [employer] is NOT to be used for urgent needs. For medical emergencies, dial 911. Now available from your iPhone and Android! Please provide this summary of care documentation to your next provider. Your primary care clinician is listed as TAYE GASCA. If you have any questions after today's visit, please call [phone].

## 2019-02-23 ENCOUNTER — HOSPITAL ENCOUNTER (EMERGENCY)
Age: 49
Discharge: HOME | End: 2019-02-23
Attending: EMERGENCY MEDICINE | Admitting: EMERGENCY MEDICINE
Payer: MEDICARE

## 2019-02-23 VITALS
SYSTOLIC BLOOD PRESSURE: 123 MMHG | RESPIRATION RATE: 17 BRPM | TEMPERATURE: 98.5 F | HEART RATE: 87 BPM | OXYGEN SATURATION: 98 % | DIASTOLIC BLOOD PRESSURE: 98 MMHG

## 2019-02-23 DIAGNOSIS — M25.512 ACUTE PAIN OF LEFT SHOULDER: Primary | ICD-10-CM

## 2019-02-23 PROCEDURE — 96372 THER/PROPH/DIAG INJ SC/IM: CPT

## 2019-02-23 PROCEDURE — 74011636637 HC RX REV CODE- 636/637: Performed by: PHYSICIAN ASSISTANT

## 2019-02-23 PROCEDURE — 74011250636 HC RX REV CODE- 250/636: Performed by: PHYSICIAN ASSISTANT

## 2019-02-23 PROCEDURE — 99282 EMERGENCY DEPT VISIT SF MDM: CPT

## 2019-02-23 PROCEDURE — A9270 NON-COVERED ITEM OR SERVICE: HCPCS | Performed by: PHYSICIAN ASSISTANT

## 2019-02-23 RX ORDER — PREDNISONE 5 MG/1
TABLET ORAL
Qty: 21 TAB | Refills: 0 | Status: SHIPPED | OUTPATIENT
Start: 2019-02-23

## 2019-02-23 RX ORDER — PREDNISONE 5 MG/1
TABLET ORAL
Qty: 21 TAB | Refills: 0 | Status: SHIPPED | OUTPATIENT
Start: 2019-02-23 | End: 2019-02-23

## 2019-02-23 RX ORDER — KETOROLAC TROMETHAMINE 30 MG/ML
60 INJECTION, SOLUTION INTRAMUSCULAR; INTRAVENOUS
Status: COMPLETED | OUTPATIENT
Start: 2019-02-23 | End: 2019-02-23

## 2019-02-23 RX ORDER — PREDNISONE 20 MG/1
60 TABLET ORAL
Status: COMPLETED | OUTPATIENT
Start: 2019-02-23 | End: 2019-02-23

## 2019-02-23 RX ADMIN — PREDNISONE 60 MG: 20 TABLET ORAL at 16:48

## 2019-02-23 RX ADMIN — KETOROLAC TROMETHAMINE 60 MG: 30 INJECTION, SOLUTION INTRAMUSCULAR at 16:48

## 2019-02-23 NOTE — DISCHARGE INSTRUCTIONS
Patient Education        Shoulder Pain: Care Instructions  Your Care Instructions    You can hurt your shoulder by using it too much during an activity, such as fishing or baseball. It can also happen as part of the everyday wear and tear of getting older. Shoulder injuries can be slow to heal, but your shoulder should get better with time. Your doctor may recommend a sling to rest your shoulder. If you have injured your shoulder, you may need testing and treatment. Follow-up care is a key part of your treatment and safety. Be sure to make and go to all appointments, and call your doctor if you are having problems. It's also a good idea to know your test results and keep a list of the medicines you take. How can you care for yourself at home? · Take pain medicines exactly as directed. ? If the doctor gave you a prescription medicine for pain, take it as prescribed. ? If you are not taking a prescription pain medicine, ask your doctor if you can take an over-the-counter medicine. ? Do not take two or more pain medicines at the same time unless the doctor told you to. Many pain medicines contain acetaminophen, which is Tylenol. Too much acetaminophen (Tylenol) can be harmful. · If your doctor recommends that you wear a sling, use it as directed. Do not take it off before your doctor tells you to. · Put ice or a cold pack on the sore area for 10 to 20 minutes at a time. Put a thin cloth between the ice and your skin. · If there is no swelling, you can put moist heat, a heating pad, or a warm cloth on your shoulder. Some doctors suggest alternating between hot and cold. · Rest your shoulder for a few days. If your doctor recommends it, you can then begin gentle exercise of the shoulder, but do not lift anything heavy. When should you call for help? Call 911 anytime you think you may need emergency care. For example, call if:    · You have chest pain or pressure.  This may occur with:  ? Sweating. ? Shortness of breath. ? Nausea or vomiting. ? Pain that spreads from the chest to the neck, jaw, or one or both shoulders or arms. ? Dizziness or lightheadedness. ? A fast or uneven pulse. After calling 911, chew 1 adult-strength aspirin. Wait for an ambulance. Do not try to drive yourself.     · Your arm or hand is cool or pale or changes color.    Call your doctor now or seek immediate medical care if:    · You have signs of infection, such as:  ? Increased pain, swelling, warmth, or redness in your shoulder. ? Red streaks leading from a place on your shoulder. ? Pus draining from an area of your shoulder. ? Swollen lymph nodes in your neck, armpits, or groin. ? A fever.    Watch closely for changes in your health, and be sure to contact your doctor if:    · You cannot use your shoulder.     · Your shoulder does not get better as expected. Where can you learn more? Go to http://kayley-cory.info/. Enter P244 in the search box to learn more about \"Shoulder Pain: Care Instructions. \"  Current as of: September 20, 2018  Content Version: 11.9  © 8954-5329 [employer]. Care instructions adapted under license by [employer] (which disclaims liability or warranty for this information). If you have questions about a medical condition or this instruction, always ask your healthcare professional. [place] [address] any warranty or liability for your use of this information. Patient Education        Shoulder Arthritis: Exercises  Your Care Instructions  Here are some examples of typical rehabilitation exercises for your condition. Start each exercise slowly. Ease off the exercise if you start to have pain. Your doctor or physical therapist will tell you when you can start these exercises and which ones will work best for you. How to do the exercises  Shoulder flexion (lying down)    1.  Lie on your back, holding a wand with both hands. Your palms should face down as you hold the wand. 2. Keeping your elbows straight, slowly raise your arms over your head. Raise them until you feel a stretch in your shoulders, upper back, and chest.  3. Hold for 15 to 30 seconds. 4. Repeat 2 to 4 times. Shoulder rotation (lying down)    1. Lie on your back. Hold a wand with both hands with your elbows bent and palms up. 2. Keep your elbows close to your body, and move the wand across your body toward the sore arm. 3. Hold for 8 to 12 seconds. 4. Repeat 2 to 4 times. Shoulder internal rotation with towel    1. Hold a towel above and behind your head with the arm that is not sore. 2. With your sore arm, reach behind your back and grasp the towel. 3. With the arm above your head, pull the towel upward. Do this until you feel a stretch on the front and outside of your sore shoulder. 4. Hold 15 to 30 seconds. 5. Repeat 2 to 4 times. Shoulder blade squeeze    1. Stand with your arms at your sides, and squeeze your shoulder blades together. Do not raise your shoulders up as you squeeze. 2. Hold 6 seconds. 3. Repeat 8 to 12 times. Resisted rows    1. Put the band around a solid object at about waist level. (A bedpost will work well.) Each hand should hold an end of the band. 2. With your elbows at your sides and bent to 90 degrees, pull the band back. Your shoulder blades should move toward each other. Return to the starting position. 3. Repeat 8 to 12 times. External rotator strengthening exercise    1. Start by tying a piece of elastic exercise material to a doorknob. You can use surgical tubing or Thera-Band. (You may also hold one end of the band in each hand.)  2. Stand or sit with your shoulder relaxed and your elbow bent 90 degrees. Your upper arm should rest comfortably against your side. Squeeze a rolled towel between your elbow and your body for comfort. This will help keep your arm at your side.   3. Hold one end of the elastic band with the hand of the painful arm. 4. Start with your forearm across your belly. Slowly rotate the forearm out away from your body. Keep your elbow and upper arm tucked against the towel roll or the side of your body until you begin to feel tightness in your shoulder. Slowly move your arm back to where you started. 5. Repeat 8 to 12 times. Internal rotator strengthening exercise    1. Start by tying a piece of elastic exercise material to a doorknob. You can use surgical tubing or Thera-Band. 2. Stand or sit with your shoulder relaxed and your elbow bent 90 degrees. Your upper arm should rest comfortably against your side. Squeeze a rolled towel between your elbow and your body for comfort. This will help keep your arm at your side. 3. Hold one end of the elastic band in the hand of the painful arm. 4. Slowly rotate your forearm toward your body until it touches your belly. Slowly move it back to where you started. 5. Keep your elbow and upper arm firmly tucked against the towel roll or at your side. 6. Repeat 8 to 12 times. Pendulum swing    1. Hold on to a table or the back of a chair with your good arm. Then bend forward a little and let your sore arm hang straight down. This exercise does not use the arm muscles. Rather, use your legs and your hips to create movement that makes your arm swing freely. 2. Use the movement from your hips and legs to guide the slightly swinging arm back and forth like a pendulum (or elephant trunk). Then guide it in circles that start small (about the size of a dinner plate). Make the circles a bit larger each day, as your pain allows. 3. Do this exercise for 5 minutes, 5 to 7 times each day. 4. As you have less pain, try bending over a little farther to do this exercise. This will increase the amount of movement at your shoulder. Follow-up care is a key part of your treatment and safety.  Be sure to make and go to all appointments, and call your doctor if you are having problems. It's also a good idea to know your test results and keep a list of the medicines you take. Where can you learn more? Go to http://kayley-cory.info/. Enter H562 in the search box to learn more about \"Shoulder Arthritis: Exercises. \"  Current as of: September 20, 2018  Content Version: 11.9  © 8384-3061 [employer]. Care instructions adapted under license by [employer] (which disclaims liability or warranty for this information). If you have questions about a medical condition or this instruction, always ask your healthcare professional. [place] [address] any warranty or liability for your use of this information.

## 2019-02-23 NOTE — ED TRIAGE NOTES
Pt arrives to triage via ambulation with c/o left shoulder pain x 5 days with worsening severity with   Rain. Pt currently in pain management, a/ox4.

## 2019-02-23 NOTE — ED PROVIDER NOTES
EMERGENCY DEPARTMENT HISTORY AND PHYSICAL EXAM 
 
4:39 PM 
 
 
Date: 2/23/2019 Patient Name: Bhargavi Chambers History of Presenting Illness Chief Complaint Patient presents with  Shoulder Pain  
  left, lump x5 days History Provided By: Patient Chief Complaint: Shoulder pain Duration:  5 days Timing:  Progressive Location: Left shoulder Quality: N/A Severity: 8 out of 10 Modifying Factors: Reports she was seen at [facility] ED and received an X-ray, which was negative. Notes she is unable to take Motrin due to her hx of MI. Associated Symptoms: denies any other associated signs or symptoms Additional History (Context): Bhargavi Chambers is a 54 y.o. female with PMHx of MI in 2014, and Arthritis who presents with  left shoulder pain onset 5 days ago. Reports she has a \"lump\" in the left shoulder. States she went to a [facility] ED, where she received an X-ray of the shoulder. Reports the X-ray showed no acute fracture. Patient denies she was given follow-up to an Orthopedist. Patient states she asked for steroids, but was told to manage her pain with Motrin. Patient states she is unable to take Motrin because she was told it simulated an MI for her. Denies that she experiences any hives or throat swelling with Motrin use. Patient notes hx of Arthritis. Notes she is currently in pain management for other pains unrelated to the shoulder. Also notes PSHx to other areas unrelated to the shoulder. Patient denies other symptoms. No other concerns were expressed at this time. PCP: None Current Outpatient Medications Medication Sig Dispense Refill  predniSONE (STERAPRED) 5 mg dose pack See administration instruction per 5mg dose pack 21 Tab 0  
 methocarbamol (ROBAXIN) 750 mg tablet Take 1 Tab by mouth four (4) times daily. 20 Tab 0  
 aspirin delayed-release 81 mg tablet Take  by mouth daily.  PARoxetine (PAXIL) 20 mg tablet Take  by mouth daily.  rOPINIRole (REQUIP) 1 mg tablet Take  by mouth nightly.  butalbital-acetaminophen-caff (FIORICET) -40 mg per capsule Take  by mouth every six (6) hours as needed for Pain.  Dexlansoprazole (DEXILANT) 60 mg CpDB Take  by mouth daily.  cholecalciferol (VITAMIN D3) 1,000 unit cap Take  by mouth daily.  gabapentin (NEURONTIN) 400 mg capsule Take 400 mg by mouth three (3) times daily.  tapentadol (NUCYNTA) 50 mg tablet Take  by mouth two (2) times a day.  insulin glargine (LANTUS) 100 unit/mL injection by SubCUTAneous route nightly. Dose dependent on fasting glucose the same morning  metFORMIN (GLUCOPHAGE) 500 mg tablet Take 500 mg by mouth two (2) times daily (with meals).  oxybutynin (DITROPAN) 5 mg tablet Take 5 mg by mouth as needed (urethra spasms).  estradiol (ESTRACE) 0.5 mg tablet Take  by mouth daily.  topiramate ER (TROKENDI XR) 200 mg capsule Take  by mouth nightly.  clonazePAM (KLONOPIN) 2 mg tablet Take  by mouth nightly.  lamoTRIgine (LAMICTAL) 200 mg tablet Take  by mouth nightly.  lisinopril (PRINIVIL, ZESTRIL) 20 mg tablet Take  by mouth daily. Past History Past Medical History: 
Past Medical History:  
Diagnosis Date  Acid reflux  Alternating constipation and diarrhea  Arthritis  Back pain  Balance problem  Bipolar 1 disorder ([address]  BMI 33.0-33.9,adult 33.1  Chronic mastoiditis  Depression   
 bipolar  Diabetes ([address]  Dizziness  Essential hypertension  GERD (gastroesophageal reflux disease)  Heart attack ([address] 2014  High cholesterol  Left ear hearing loss  Liver disease  Migraines  Serous otitis media  Vision decreased Past Surgical History: 
Past Surgical History:  
Procedure Laterality Date  ABDOMEN SURGERY PROC UNLISTED    
 cholecystectomy  CARDIAC SURG PROCEDURE UNLIST  05/22/2014  
 stent placed  HX APPENDECTOMY [address]  
 hysterectomy  HX ORTHOPAEDIC    
 3 discestomies , lumbar  HX ORTHOPAEDIC    
 spinal tear repair  HX ORTHOPAEDIC    
 L3-4-S1 fusion  HX OTHER SURGICAL Breast reduction Family History: 
Family History Problem Relation Age of Onset  Hypertension Mother  Hypertension Father  Diabetes Sister  Hypertension Maternal Grandmother  Diabetes Maternal Grandmother  Hypertension Maternal Grandfather Social History: 
Social History Tobacco Use  Smoking status: Former Smoker Types: Cigarettes Last attempt to quit: 2014 Years since quittin.7  Smokeless tobacco: Never Used Substance Use Topics  Alcohol use: No  
 Drug use: No  
 
 
Allergies: Allergies Allergen Reactions  Iodine Anaphylaxis  Shellfish Derived Anaphylaxis  Codeine Hives  Haldol [Haloperidol Lactate] Other (comments)  
  violent  Imitrex [Sumatriptan] Swelling  Penicillins Hives  Ultram [Tramadol] Hives Review of Systems Review of Systems Constitutional: Negative for fever. HENT: Negative for facial swelling. Eyes: Negative for visual disturbance. Respiratory: Negative for shortness of breath. Cardiovascular: Negative for chest pain. Gastrointestinal: Negative for abdominal pain. Genitourinary: Negative for dysuria. Musculoskeletal: Positive for arthralgias (lump to left shoulder ). Negative for neck pain. Skin: Negative for rash. Neurological: Negative for dizziness. Psychiatric/Behavioral: Negative for confusion. All other systems reviewed and are negative. Physical Exam  
 
Visit Vitals BP (!) 123/98 (BP 1 Location: Left arm, BP Patient Position: At rest) Pulse 87 Temp 98.5 °F (36.9 °C) Resp 17 SpO2 98% Physical Exam  
Constitutional: She is oriented to person, place, and time. She appears well-developed and well-nourished. No distress.   
HENT:  
 Head: Normocephalic and atraumatic. Eyes: Conjunctivae are normal.  
Neck: Normal range of motion. Cardiovascular: Normal rate and regular rhythm. Pulmonary/Chest: Effort normal.  
Abdominal: She exhibits no distension. Musculoskeletal: Normal range of motion. Diffuse tenderness to left shoulder, nonspecific location. Pain with passive abduction. Neurological: She is alert and oriented to person, place, and time. Neurologically intact LUE Skin: Skin is warm and dry. She is not diaphoretic. Psychiatric: She has a normal mood and affect. Nursing note and vitals reviewed. Diagnostic Study Results Labs - No results found for this or any previous visit (from the past 12 hour(s)). Radiologic Studies - No orders to display Medical Decision Making I am the first provider for this patient. I reviewed the vital signs, available nursing notes, past medical history, past surgical history, family history and social history. Vital Signs-Reviewed the patient's vital signs. Records Reviewed: Nursing Notes and Triage notes  (Time of Review: 4:39 PM) ED Course: Progress Notes, Reevaluation, and Consults: 
 
Provider Notes (Medical Decision Making): MDM Number of Diagnoses or Management Options Acute pain of left shoulder:  
Diagnosis management comments: Had neg xray at [facility] 2 days ago. Eloped due to argument over prescriptions. Patient is not requesting narcotics today. Referred to ortho. Discussed treatment plan, return precautions, symptomatic relief, and expected time to improvement. All questions answered. Patient is stable for discharge and outpatient management. Diagnosis Clinical Impression: 1. Acute pain of left shoulder Disposition:  
 
 
Follow-up Information Follow up With Specialties Details Why Contact Info  Agustín Garcia MD Orthopedic Surgery Schedule an appointment as soon as possible for a visit  [address] [place] [address] [facility] [place] [id number] 
[phone] [facility] EMERGENCY DEPT Emergency Medicine  Immediately if symptoms worsen [address] Medication List  
  
START taking these medications   
predniSONE 5 mg dose pack Commonly known as:  STERAPRED See administration instruction per 5mg dose pack ASK your doctor about these medications   
aspirin delayed-release 81 mg tablet 
  
clonazePAM 2 mg tablet Commonly known as:  Layman Sella DEXILANT 60 mg Cpdb capsule (delayed release) Generic drug:  dexlansoprazole 
  
estradiol 0.5 mg tablet Commonly known as:  ESTRACE FIORICET -40 mg per capsule Generic drug:  butalbital-acetaminophen-caff 
  
gabapentin 400 mg capsule Commonly known as:  NEURONTIN 
  
lamoTRIgine 200 mg tablet Commonly known as: LaMICtal 
  
LANTUS U-100 INSULIN 100 unit/mL injection Generic drug:  insulin glargine 
  
lisinopril 20 mg tablet Commonly known as:  PRINIVIL, ZESTRIL 
  
metFORMIN 500 mg tablet Commonly known as:  GLUCOPHAGE 
  
methocarbamol 750 mg tablet Commonly known as:  ROBAXIN Take 1 Tab by mouth four (4) times daily. NUCYNTA 50 mg tablet Generic drug:  tapentadol 
  
oxybutynin 5 mg tablet Commonly known as:  DITROPAN 
  
PAXIL 20 mg tablet Generic drug:  PARoxetine REQUIP 1 mg tablet Generic drug:  rOPINIRole TROKENDI  mg capsule Generic drug:  topiramate ER 
  
VITAMIN D3 1,000 unit Cap Generic drug:  cholecalciferol Where to Get Your Medications These medications were sent to [facility] [address] [facility], [address] [address] Phone:  [phone] · predniSONE 5 mg dose pack 
  
 
_______________________________ Attestations: 
Ronda Attestation María Gabriel acting as a scribe for and in the presence of [employer], [employer] February 23, 2019 at 4:39 PM 
    
Provider Attestation:     
I personally performed the services described in the documentation, reviewed the documentation, as recorded by the scribe in my presence, and it accurately and completely records my words and actions. February 23, 2019 at 4:39 PM - ARPIT Rodriguez 
______________________ 
_________

## (undated) DEVICE — AVANOS* SHORT BEVEL NEEDLE: Brand: AVANOS

## (undated) DEVICE — DRAPE TWL SURG 16X26IN BLU ORB04] ALLCARE INC]

## (undated) DEVICE — (D)BNDG ADHESIVE FABRIC 3/4X3 -- DISC BY MFR USE ITEM 357960

## (undated) DEVICE — CUFF BLD PRESSURE MONITORING LNG AD 23-33 CM 1 TUBE MY CUF

## (undated) DEVICE — SYR 10ML CTRL LR LCK NSAF LF --

## (undated) DEVICE — TRAY SUPP STD NO DRUG W EXTENSION SET

## (undated) DEVICE — NEEDLE SPNL 22GA L3.5IN BLK HUB S STL REG WALL FIT STYL W/

## (undated) DEVICE — MIRAGE SWIFT II PILLOW LGE: Brand: MIRAGE SWIFT II